# Patient Record
Sex: FEMALE | Race: BLACK OR AFRICAN AMERICAN | NOT HISPANIC OR LATINO | Employment: FULL TIME | URBAN - METROPOLITAN AREA
[De-identification: names, ages, dates, MRNs, and addresses within clinical notes are randomized per-mention and may not be internally consistent; named-entity substitution may affect disease eponyms.]

---

## 2017-12-18 ENCOUNTER — OFFICE VISIT (OUTPATIENT)
Dept: INTERNAL MEDICINE | Facility: CLINIC | Age: 27
End: 2017-12-18
Payer: COMMERCIAL

## 2017-12-18 ENCOUNTER — LAB VISIT (OUTPATIENT)
Dept: LAB | Facility: HOSPITAL | Age: 27
End: 2017-12-18
Attending: INTERNAL MEDICINE
Payer: COMMERCIAL

## 2017-12-18 VITALS
HEART RATE: 73 BPM | HEIGHT: 66 IN | SYSTOLIC BLOOD PRESSURE: 128 MMHG | OXYGEN SATURATION: 97 % | BODY MASS INDEX: 23.77 KG/M2 | DIASTOLIC BLOOD PRESSURE: 72 MMHG | WEIGHT: 147.94 LBS | TEMPERATURE: 97 F

## 2017-12-18 DIAGNOSIS — Z00.00 ROUTINE GENERAL MEDICAL EXAMINATION AT A HEALTH CARE FACILITY: ICD-10-CM

## 2017-12-18 DIAGNOSIS — E55.9 VITAMIN D DEFICIENCY: ICD-10-CM

## 2017-12-18 DIAGNOSIS — Z00.00 ROUTINE GENERAL MEDICAL EXAMINATION AT A HEALTH CARE FACILITY: Primary | ICD-10-CM

## 2017-12-18 LAB
25(OH)D3+25(OH)D2 SERPL-MCNC: 22 NG/ML
ALBUMIN SERPL BCP-MCNC: 3.8 G/DL
ALP SERPL-CCNC: 58 U/L
ALT SERPL W/O P-5'-P-CCNC: 12 U/L
ANION GAP SERPL CALC-SCNC: 6 MMOL/L
AST SERPL-CCNC: 16 U/L
BASOPHILS # BLD AUTO: 0.05 K/UL
BASOPHILS NFR BLD: 1 %
BILIRUB SERPL-MCNC: 0.6 MG/DL
BUN SERPL-MCNC: 7 MG/DL
CALCIUM SERPL-MCNC: 9.6 MG/DL
CHLORIDE SERPL-SCNC: 107 MMOL/L
CHOLEST SERPL-MCNC: 148 MG/DL
CHOLEST/HDLC SERPL: 2.2 {RATIO}
CO2 SERPL-SCNC: 25 MMOL/L
CREAT SERPL-MCNC: 0.9 MG/DL
DIFFERENTIAL METHOD: ABNORMAL
EOSINOPHIL # BLD AUTO: 0.2 K/UL
EOSINOPHIL NFR BLD: 3.5 %
ERYTHROCYTE [DISTWIDTH] IN BLOOD BY AUTOMATED COUNT: 13.1 %
EST. GFR  (AFRICAN AMERICAN): >60 ML/MIN/1.73 M^2
EST. GFR  (NON AFRICAN AMERICAN): >60 ML/MIN/1.73 M^2
GLUCOSE SERPL-MCNC: 79 MG/DL
HCT VFR BLD AUTO: 37 %
HDLC SERPL-MCNC: 67 MG/DL
HDLC SERPL: 45.3 %
HGB BLD-MCNC: 11.6 G/DL
IMM GRANULOCYTES # BLD AUTO: 0.01 K/UL
IMM GRANULOCYTES NFR BLD AUTO: 0.2 %
LDLC SERPL CALC-MCNC: 74.4 MG/DL
LYMPHOCYTES # BLD AUTO: 2.2 K/UL
LYMPHOCYTES NFR BLD: 42.4 %
MCH RBC QN AUTO: 27.3 PG
MCHC RBC AUTO-ENTMCNC: 31.4 G/DL
MCV RBC AUTO: 87 FL
MONOCYTES # BLD AUTO: 0.4 K/UL
MONOCYTES NFR BLD: 8.1 %
NEUTROPHILS # BLD AUTO: 2.3 K/UL
NEUTROPHILS NFR BLD: 44.8 %
NONHDLC SERPL-MCNC: 81 MG/DL
NRBC BLD-RTO: 0 /100 WBC
PLATELET # BLD AUTO: 292 K/UL
PMV BLD AUTO: 11 FL
POTASSIUM SERPL-SCNC: 4.6 MMOL/L
PROT SERPL-MCNC: 7.2 G/DL
RBC # BLD AUTO: 4.25 M/UL
SODIUM SERPL-SCNC: 138 MMOL/L
T4 FREE SERPL-MCNC: 1.01 NG/DL
TRIGL SERPL-MCNC: 33 MG/DL
TSH SERPL DL<=0.005 MIU/L-ACNC: 0.47 UIU/ML
WBC # BLD AUTO: 5.19 K/UL

## 2017-12-18 PROCEDURE — 99999 PR PBB SHADOW E&M-NEW PATIENT-LVL III: CPT | Mod: PBBFAC,,, | Performed by: INTERNAL MEDICINE

## 2017-12-18 PROCEDURE — 85025 COMPLETE CBC W/AUTO DIFF WBC: CPT

## 2017-12-18 PROCEDURE — 36415 COLL VENOUS BLD VENIPUNCTURE: CPT | Mod: PO

## 2017-12-18 PROCEDURE — 80061 LIPID PANEL: CPT

## 2017-12-18 PROCEDURE — 99204 OFFICE O/P NEW MOD 45 MIN: CPT | Mod: S$GLB,,, | Performed by: INTERNAL MEDICINE

## 2017-12-18 PROCEDURE — 84443 ASSAY THYROID STIM HORMONE: CPT

## 2017-12-18 PROCEDURE — 82306 VITAMIN D 25 HYDROXY: CPT

## 2017-12-18 PROCEDURE — 84439 ASSAY OF FREE THYROXINE: CPT

## 2017-12-18 PROCEDURE — 80053 COMPREHEN METABOLIC PANEL: CPT

## 2017-12-18 RX ORDER — ETONOGESTREL AND ETHINYL ESTRADIOL VAGINAL RING .015; .12 MG/D; MG/D
1 RING VAGINAL
COMMUNITY
End: 2018-10-11 | Stop reason: SDUPTHER

## 2017-12-18 NOTE — PROGRESS NOTES
"Subjective:      Patient ID: Jessy Ferraro is a 27 y.o. female.    Chief Complaint: Establish Care and Leg Pain      HPI  Ms. Ferraro who presents to [facility] primary care. She reports having left leg pain over the past week, which she describes as a burning sensation. No recent trauma. No cp. No sob.     Past Medical History:   Diagnosis Date    Chronic back pain     She attributes this to enlarged breasts since adolecense.     History reviewed. No pertinent surgical history.  Social History     Social History    Marital status: Single     Spouse name: N/A    Number of children: N/A    Years of education: N/A     Occupational History    Not on file.     Social History Main Topics    Smoking status: Never Smoker    Smokeless tobacco: Never Used    Alcohol use 3.6 oz/week     6 Shots of liquor per week      Comment: 2 glasses 3x/wk    Drug use: No    Sexual activity: Yes     Partners: Male      Comment: 1 partner     Other Topics Concern    Not on file     Social History Narrative    Life goal: History, currently at [employer], working on her PhD        Breakfast: Oatmeal or eggs & avacodo; water or tea or coffee with creamer    Lunch: Salad or tuna-fish sandwich; water or Smoothie    Dinner: Soup; water    Snacks: Chocolate or nuts    Eats out: 2x/wk     Water: 7 (16 oz) bottles/day    PA: None    Goal weight is 130 lbs         History reviewed. No pertinent family history.    Current Outpatient Prescriptions:     etonogestrel-ethinyl estradiol (NUVARING) 0.12-0.015 mg/24 hr vaginal ring, Place 1 each vaginally every 28 days., Disp: , Rfl:     Review of patient's allergies indicates:  No Known Allergies    Review of Systems   Negative.     Objective:     /72 (BP Location: Left arm, Patient Position: Sitting, BP Method: Medium (Automatic))   Pulse 73   Temp 97.3 °F (36.3 °C) (Tympanic)   Ht 5' 6" (1.676 m)   Wt 67.1 kg (147 lb 14.9 oz)   LMP 11/27/2017 (Exact Date) Comment: nuvaring  SpO2 97%   BMI " 23.88 kg/m²     Physical Exam  GEN: A&O fully, NAD  PSYC: Normal affect  CV: RRR, no M/G/R  PULM: CTA bilaterally, no wheezes, rales  GI: S/NT/ND, normal bowel sounds  EXT: No C/C/E, negative Adams's  BREAST: No axillary LAD, no breast masses, no nipple discharge  NEURO: CN II-XII intact, 5/5 strength globally, no sensory losses, normal tandem gait, normal Romberg, 2+ DTRs globally      Assessment:      1. Routine general medical examination at a health care facility    2. Vitamin D deficiency    3.      LBP: Likely 2/2 large breasts over several years. Encouraged weight loss. Discussed strategies for            lifestyle modifications, including systematically increasing physical activity, water; and avoiding potatoes,            sugar sweetened beverages, red/processed meats, and fast food; and increasing yogurt, whole fruits,            unsalted nuts, whole grains, non-starchy vegetables, beans, weight logging.  4.      Left leg pain: Normal PX. Will check for metabolic etiologies. Unlikely DVT given absence of PX findings           or RFs.    Plan:   Routine general medical examination at a health care facility  -     CBC auto differential; Future; Expected date: 12/18/2017  -     Comprehensive metabolic panel; Future; Expected date: 12/18/2017  -     Lipid panel; Future; Expected date: 12/18/2017  -     T4, free; Future; Expected date: 12/18/2017  -     TSH; Future; Expected date: 12/18/2017    Vitamin D deficiency  -     Vitamin D; Future; Expected date: 12/18/2017      RTC in 3 months RE LBP or sooner as needed

## 2018-02-08 ENCOUNTER — OFFICE VISIT (OUTPATIENT)
Dept: INTERNAL MEDICINE | Facility: CLINIC | Age: 28
End: 2018-02-08
Payer: COMMERCIAL

## 2018-02-08 VITALS
BODY MASS INDEX: 23.7 KG/M2 | SYSTOLIC BLOOD PRESSURE: 131 MMHG | RESPIRATION RATE: 18 BRPM | WEIGHT: 147.5 LBS | HEIGHT: 66 IN | DIASTOLIC BLOOD PRESSURE: 61 MMHG | TEMPERATURE: 100 F | OXYGEN SATURATION: 98 % | HEART RATE: 104 BPM

## 2018-02-08 DIAGNOSIS — G89.29 CHRONIC LOW BACK PAIN WITHOUT SCIATICA, UNSPECIFIED BACK PAIN LATERALITY: ICD-10-CM

## 2018-02-08 DIAGNOSIS — M54.50 CHRONIC LOW BACK PAIN WITHOUT SCIATICA, UNSPECIFIED BACK PAIN LATERALITY: ICD-10-CM

## 2018-02-08 DIAGNOSIS — N10 PYELONEPHRITIS, ACUTE: Primary | ICD-10-CM

## 2018-02-08 LAB
BILIRUB SERPL-MCNC: NEGATIVE MG/DL
BLOOD, POC UA: ABNORMAL
GLUCOSE UR QL STRIP: NORMAL
KETONES UR QL STRIP: NEGATIVE
LEUKOCYTE ESTERASE URINE, POC: ABNORMAL
NITRITE, POC UA: POSITIVE
PH, POC UA: 6
PROTEIN, POC: ABNORMAL
SPECIFIC GRAVITY, POC UA: 1
UROBILINOGEN, POC UA: NORMAL

## 2018-02-08 PROCEDURE — 99213 OFFICE O/P EST LOW 20 MIN: CPT | Mod: 25,S$GLB,, | Performed by: INTERNAL MEDICINE

## 2018-02-08 PROCEDURE — 87088 URINE BACTERIA CULTURE: CPT

## 2018-02-08 PROCEDURE — 87186 SC STD MICRODIL/AGAR DIL: CPT

## 2018-02-08 PROCEDURE — 81000 URINALYSIS NONAUTO W/SCOPE: CPT | Mod: S$GLB,,, | Performed by: INTERNAL MEDICINE

## 2018-02-08 PROCEDURE — 99999 PR PBB SHADOW E&M-EST. PATIENT-LVL III: CPT | Mod: PBBFAC,,, | Performed by: INTERNAL MEDICINE

## 2018-02-08 PROCEDURE — 3008F BODY MASS INDEX DOCD: CPT | Mod: S$GLB,,, | Performed by: INTERNAL MEDICINE

## 2018-02-08 PROCEDURE — 87077 CULTURE AEROBIC IDENTIFY: CPT

## 2018-02-08 PROCEDURE — 87086 URINE CULTURE/COLONY COUNT: CPT

## 2018-02-08 RX ORDER — SULFAMETHOXAZOLE AND TRIMETHOPRIM 800; 160 MG/1; MG/1
1 TABLET ORAL 2 TIMES DAILY
Qty: 28 TABLET | Refills: 0 | Status: SHIPPED | OUTPATIENT
Start: 2018-02-08 | End: 2018-02-22

## 2018-02-08 NOTE — PROGRESS NOTES
Subjective:      Patient ID: Jessy Ferraro is a 27 y.o. female.    Chief Complaint: Flank Pain and Urinary Tract Infection      HPI     Ms. Jessy Ferraro is a patient of Anthony Williamson MD, who presents for a 1 month h/o urinary frequency and urgency. She has noted a dark orange urine color with a foul smell. 2 days ago she noted right flank pain. No fever, but +chills. +nausea, but no emesis. She also notes HA over the past few days.        Past Medical History:   Diagnosis Date    Chronic back pain     She attributes this to enlarged breasts since adolecense.     History reviewed. No pertinent surgical history.  Social History     Social History    Marital status: Single     Spouse name: N/A    Number of children: N/A    Years of education: N/A     Occupational History    Not on file.     Social History Main Topics    Smoking status: Never Smoker    Smokeless tobacco: Never Used    Alcohol use 3.6 oz/week     6 Shots of liquor per week      Comment: 2 glasses 3x/wk    Drug use: No    Sexual activity: Yes     Partners: Male      Comment: 1 partner     Other Topics Concern    Not on file     Social History Narrative    Life goal: History, currently at [employer], working on her PhD        Breakfast: Oatmeal or eggs & avacodo; water or tea or coffee with creamer    Lunch: Salad or tuna-fish sandwich; water or Smoothie    Dinner: Soup; water    Snacks: Chocolate or nuts    Eats out: 2x/wk     Water: 7 (16 oz) bottles/day    PA: None    Goal weight is 130 lbs         History reviewed. No pertinent family history.    Current Outpatient Prescriptions:     etonogestrel-ethinyl estradiol (NUVARING) 0.12-0.015 mg/24 hr vaginal ring, Place 1 each vaginally every 28 days., Disp: , Rfl:     sulfamethoxazole-trimethoprim 800-160mg (BACTRIM DS) 800-160 mg Tab, Take 1 tablet by mouth 2 (two) times daily., Disp: 28 tablet, Rfl: 0    Review of patient's allergies indicates:  No Known Allergies     Review of Systems  "  Negative    Objective:     /61 (BP Location: Left arm, Patient Position: Sitting, BP Method: Medium (Automatic))   Pulse 104   Temp 99.9 °F (37.7 °C) (Tympanic)   Resp 18   Ht 5' 6" (1.676 m)   Wt 66.9 kg (147 lb 7.8 oz)   SpO2 98%   BMI 23.81 kg/m²     Physical Exam  GEN: A&O fully, NAD  PSYC: Normal affect  : No TTP, but + tenderness to percussion at right costophrenic angle       Lab Results   Component Value Date    WBC 5.19 12/18/2017    HGB 11.6 (L) 12/18/2017    HCT 37.0 12/18/2017     12/18/2017    CHOL 148 12/18/2017    TRIG 33 12/18/2017    HDL 67 12/18/2017    LDLCALC 74.4 12/18/2017    ALT 12 12/18/2017    AST 16 12/18/2017     12/18/2017    K 4.6 12/18/2017     12/18/2017    CREATININE 0.9 12/18/2017    BUN 7 12/18/2017    CO2 25 12/18/2017    TSH 0.475 12/18/2017       Assessment:      1. Pyelonephritis, acute: Risks and benefits discussed and patient chose to move forward with bactrim DS 1 po BID x14d. She understands to go to the ER if her symptoms worsen or fail to improve in a few days.    2. Chronic low back pain without sciatica, unspecified back pain laterality: She would like a referral for breast reduction surgery due to chronic LBP 2/2 stress from breasts.        Plan:   Pyelonephritis, acute  -     sulfamethoxazole-trimethoprim 800-160mg (BACTRIM DS) 800-160 mg Tab; Take 1 tablet by mouth 2 (two) times daily.  Dispense: 28 tablet; Refill: 0  -     Urine culture    Chronic low back pain without sciatica, unspecified back pain laterality  -     Ambulatory consult to Plastic Surgery        Follow-up in about 4 weeks (around 3/8/2018), or if symptoms worsen or fail to improve, for FU on LBP.  "

## 2018-02-12 LAB — BACTERIA UR CULT: NORMAL

## 2018-03-02 ENCOUNTER — TELEPHONE (OUTPATIENT)
Dept: INTERNAL MEDICINE | Facility: CLINIC | Age: 28
End: 2018-03-02

## 2018-03-02 NOTE — TELEPHONE ENCOUNTER
----- Message from Mary Carter sent at 3/2/2018  9:00 AM CST -----  Contact: pt  Pt states she's calling to find out about the status of her referral for a breast reduction, she can be reached at [phone] Thanks

## 2018-03-02 NOTE — TELEPHONE ENCOUNTER
Spoke with pt, she stated she would like to know what plastic surgeon  recommended for pt's breast reduction. Let pt know I would send message to  and call back with information on Monday. Pt verbalized understanding.

## 2018-03-05 ENCOUNTER — PATIENT OUTREACH (OUTPATIENT)
Dept: ADMINISTRATIVE | Facility: HOSPITAL | Age: 28
End: 2018-03-05

## 2018-09-06 ENCOUNTER — OFFICE VISIT (OUTPATIENT)
Dept: INTERNAL MEDICINE | Facility: CLINIC | Age: 28
End: 2018-09-06
Payer: COMMERCIAL

## 2018-09-06 ENCOUNTER — LAB VISIT (OUTPATIENT)
Dept: LAB | Facility: HOSPITAL | Age: 28
End: 2018-09-06
Attending: INTERNAL MEDICINE
Payer: COMMERCIAL

## 2018-09-06 VITALS
TEMPERATURE: 98 F | HEART RATE: 71 BPM | RESPIRATION RATE: 18 BRPM | SYSTOLIC BLOOD PRESSURE: 120 MMHG | HEIGHT: 66 IN | DIASTOLIC BLOOD PRESSURE: 60 MMHG | OXYGEN SATURATION: 98 % | WEIGHT: 142.19 LBS | BODY MASS INDEX: 22.85 KG/M2

## 2018-09-06 DIAGNOSIS — G89.29 OTHER CHRONIC BACK PAIN: ICD-10-CM

## 2018-09-06 DIAGNOSIS — Z01.818 PREOP EXAMINATION: ICD-10-CM

## 2018-09-06 DIAGNOSIS — E55.9 VITAMIN D DEFICIENCY: ICD-10-CM

## 2018-09-06 DIAGNOSIS — Z01.818 PREOP EXAMINATION: Primary | ICD-10-CM

## 2018-09-06 DIAGNOSIS — M54.9 OTHER CHRONIC BACK PAIN: ICD-10-CM

## 2018-09-06 LAB
ALBUMIN SERPL BCP-MCNC: 4.1 G/DL
ALP SERPL-CCNC: 62 U/L
ALT SERPL W/O P-5'-P-CCNC: 13 U/L
ANION GAP SERPL CALC-SCNC: 8 MMOL/L
AST SERPL-CCNC: 17 U/L
BASOPHILS # BLD AUTO: 0.06 K/UL
BASOPHILS NFR BLD: 1 %
BILIRUB SERPL-MCNC: 0.4 MG/DL
BUN SERPL-MCNC: 6 MG/DL
CALCIUM SERPL-MCNC: 10.1 MG/DL
CHLORIDE SERPL-SCNC: 106 MMOL/L
CO2 SERPL-SCNC: 25 MMOL/L
CREAT SERPL-MCNC: 0.8 MG/DL
DIFFERENTIAL METHOD: ABNORMAL
EOSINOPHIL # BLD AUTO: 0.1 K/UL
EOSINOPHIL NFR BLD: 2.4 %
ERYTHROCYTE [DISTWIDTH] IN BLOOD BY AUTOMATED COUNT: 12.7 %
EST. GFR  (AFRICAN AMERICAN): >60 ML/MIN/1.73 M^2
EST. GFR  (NON AFRICAN AMERICAN): >60 ML/MIN/1.73 M^2
GLUCOSE SERPL-MCNC: 84 MG/DL
HCT VFR BLD AUTO: 39.3 %
HGB BLD-MCNC: 12.4 G/DL
IMM GRANULOCYTES # BLD AUTO: 0.01 K/UL
IMM GRANULOCYTES NFR BLD AUTO: 0.2 %
LYMPHOCYTES # BLD AUTO: 3.3 K/UL
LYMPHOCYTES NFR BLD: 54.9 %
MCH RBC QN AUTO: 28.6 PG
MCHC RBC AUTO-ENTMCNC: 31.6 G/DL
MCV RBC AUTO: 91 FL
MONOCYTES # BLD AUTO: 0.5 K/UL
MONOCYTES NFR BLD: 8.4 %
NEUTROPHILS # BLD AUTO: 2 K/UL
NEUTROPHILS NFR BLD: 33.1 %
NRBC BLD-RTO: 0 /100 WBC
PLATELET # BLD AUTO: 336 K/UL
PMV BLD AUTO: 10.6 FL
POTASSIUM SERPL-SCNC: 4.4 MMOL/L
PROT SERPL-MCNC: 7.5 G/DL
RBC # BLD AUTO: 4.34 M/UL
SODIUM SERPL-SCNC: 139 MMOL/L
WBC # BLD AUTO: 5.94 K/UL

## 2018-09-06 PROCEDURE — 99213 OFFICE O/P EST LOW 20 MIN: CPT | Mod: S$GLB,,, | Performed by: INTERNAL MEDICINE

## 2018-09-06 PROCEDURE — 85025 COMPLETE CBC W/AUTO DIFF WBC: CPT

## 2018-09-06 PROCEDURE — 36415 COLL VENOUS BLD VENIPUNCTURE: CPT | Mod: PO

## 2018-09-06 PROCEDURE — 3008F BODY MASS INDEX DOCD: CPT | Mod: CPTII,S$GLB,, | Performed by: INTERNAL MEDICINE

## 2018-09-06 PROCEDURE — 80053 COMPREHEN METABOLIC PANEL: CPT

## 2018-09-06 PROCEDURE — 99999 PR PBB SHADOW E&M-EST. PATIENT-LVL III: CPT | Mod: PBBFAC,,, | Performed by: INTERNAL MEDICINE

## 2018-09-06 PROCEDURE — 93005 ELECTROCARDIOGRAM TRACING: CPT | Mod: S$GLB,,, | Performed by: INTERNAL MEDICINE

## 2018-09-06 PROCEDURE — 93010 ELECTROCARDIOGRAM REPORT: CPT | Mod: S$GLB,,, | Performed by: INTERNAL MEDICINE

## 2018-09-06 NOTE — PROGRESS NOTES
Subjective:      Patient ID: Jessy Ferraro is a 27 y.o. female.    Chief Complaint: Pre-op Exam      HPI     Ms. Jessy Ferraro is a patient of Anthony Williamson MD, who presents for pre-op for a bilateral breast reduction surgery at the [facility] in [place], [place] on 9/24/18.     No problems with her health.     No cp, no sob, she runs 1 mile once per week, but is limmitted by discomfort from breast tissue, which causes some GUILLORY, which she attributes to the breast tissue. No associated n/v. No associated diaphoresis.       Past Medical History:   Diagnosis Date    Chronic back pain     She attributes this to enlarged breasts since adolecense.    Vitamin D deficiency      History reviewed. No pertinent surgical history.  Social History     Socioeconomic History    Marital status: Single     Spouse name: Not on file    Number of children: Not on file    Years of education: Not on file    Highest education level: Not on file   Social Needs    Financial resource strain: Not on file    Food insecurity - worry: Not on file    Food insecurity - inability: Not on file    Transportation needs - medical: Not on file    Transportation needs - non-medical: Not on file   Occupational History    Not on file   Tobacco Use    Smoking status: Never Smoker    Smokeless tobacco: Never Used   Substance and Sexual Activity    Alcohol use: Yes     Alcohol/week: 3.6 oz     Types: 6 Shots of liquor per week     Comment: 2 glasses 3x/wk    Drug use: No    Sexual activity: Yes     Partners: Male     Comment: 1 partner   Other Topics Concern    Not on file   Social History Narrative    Life goal: History, currently at [employer], working on her PhD        Breakfast: Oatmeal or eggs & avacodo; water or tea or coffee with creamer    Lunch: Salad or tuna-fish sandwich; water or Smoothie    Dinner: Soup; water    Snacks: Chocolate or nuts    Eats out: 2x/wk     Water: 7 (16 oz) bottles/day    PA: Runs 1x/wk, yoga 30-90  "min/day (previously none)    Goal weight is 130 lbs     History reviewed. No pertinent family history.    Current Outpatient Medications:     etonogestrel-ethinyl estradiol (NUVARING) 0.12-0.015 mg/24 hr vaginal ring, Place 1 each vaginally every 28 days., Disp: , Rfl:     Review of patient's allergies indicates:  No Known Allergies     Review of Systems   All remaining systems negative    Objective:     /60 (BP Location: Left arm, Patient Position: Sitting, BP Method: Medium (Manual))   Pulse 71   Temp 98.4 °F (36.9 °C) (Tympanic)   Resp 18   Ht 5' 6" (1.676 m)   Wt 64.5 kg (142 lb 3.2 oz)   SpO2 98%   BMI 22.95 kg/m²     Physical Exam  GEN: A&O fully, NAD  PSYC: Normal affect  HEENT: OP: Clear, no LAD, no thyroid masses, auditory canals and TMs WNL  CV: RRR, no M/G/R  PULM: CTA bilaterally, no wheezes, rales, crackles   GI: S/NT/ND, normal bowel sounds  EXT: No C/C/E, normal DP pulses bilaterally  NEURO: CN II-XII intact, 5/5 strength globally, no sensory losses, normal tandem gait, normal Romberg, 2+ DTRs globally      Lab Results   Component Value Date    WBC 5.19 12/18/2017    HGB 11.6 (L) 12/18/2017    HCT 37.0 12/18/2017     12/18/2017    CHOL 148 12/18/2017    TRIG 33 12/18/2017    HDL 67 12/18/2017    LDLCALC 74.4 12/18/2017    ALT 12 12/18/2017    AST 16 12/18/2017     12/18/2017    K 4.6 12/18/2017     12/18/2017    CREATININE 0.9 12/18/2017    BUN 7 12/18/2017    CO2 25 12/18/2017    CALCIUM 9.6 12/18/2017       Assessment:      1. Preop examination: Low risk patient for a moderate risk surgery (general anesthesia). Will check cbc w/ diff, cmp and EKG per pre-op requirements. No further tests indicated at this time.    2. Vitamin D deficiency: Currently not supplementing. Will check in 3 months.    3. Other chronic back pain: Related to excessive breast tissue, which is being addressed as above. Will f/u in 3 months.        Plan:   Preop examination  -     CBC auto " differential; Future; Expected date: 09/06/2018  -     Comprehensive metabolic panel; Future; Expected date: 09/06/2018  -     EKG 12-lead    Vitamin D deficiency    Other chronic back pain        Follow-up in about 3 months (around 12/6/2018), or if symptoms worsen or fail to improve, for Annual.    I spent 25 minutes of time with patient 50% or more of which was discussing labs and plans of care.

## 2018-10-11 ENCOUNTER — OFFICE VISIT (OUTPATIENT)
Dept: OBSTETRICS AND GYNECOLOGY | Facility: CLINIC | Age: 28
End: 2018-10-11
Payer: COMMERCIAL

## 2018-10-11 VITALS
BODY MASS INDEX: 22.49 KG/M2 | WEIGHT: 139.31 LBS | SYSTOLIC BLOOD PRESSURE: 106 MMHG | DIASTOLIC BLOOD PRESSURE: 68 MMHG

## 2018-10-11 DIAGNOSIS — Z12.4 PAP SMEAR FOR CERVICAL CANCER SCREENING: ICD-10-CM

## 2018-10-11 DIAGNOSIS — Z11.3 SCREEN FOR STD (SEXUALLY TRANSMITTED DISEASE): ICD-10-CM

## 2018-10-11 DIAGNOSIS — Z30.44 ENCOUNTER FOR SURVEILLANCE OF VAGINAL RING HORMONAL CONTRACEPTIVE DEVICE: Primary | ICD-10-CM

## 2018-10-11 PROCEDURE — 88175 CYTOPATH C/V AUTO FLUID REDO: CPT

## 2018-10-11 PROCEDURE — 87491 CHLMYD TRACH DNA AMP PROBE: CPT

## 2018-10-11 PROCEDURE — 99999 PR PBB SHADOW E&M-EST. PATIENT-LVL III: CPT | Mod: PBBFAC,,, | Performed by: OBSTETRICS & GYNECOLOGY

## 2018-10-11 PROCEDURE — 99385 PREV VISIT NEW AGE 18-39: CPT | Mod: S$GLB,,, | Performed by: OBSTETRICS & GYNECOLOGY

## 2018-10-11 RX ORDER — ETONOGESTREL AND ETHINYL ESTRADIOL VAGINAL RING .015; .12 MG/D; MG/D
RING VAGINAL
Qty: 3 EACH | Refills: 3 | Status: SHIPPED | OUTPATIENT
Start: 2018-10-11 | End: 2019-02-27 | Stop reason: SDUPTHER

## 2018-10-11 RX ORDER — TRAMADOL HYDROCHLORIDE 50 MG/1
50 TABLET ORAL EVERY 6 HOURS PRN
COMMUNITY
End: 2018-11-30

## 2018-10-11 RX ORDER — CELECOXIB 50 MG/1
100 CAPSULE ORAL
COMMUNITY
End: 2018-11-30

## 2018-10-11 NOTE — PROGRESS NOTES
Subjective:       Patient ID: Jessy Ferraro is a 27 y.o. female.    Chief Complaint:  Well Woman      History of Present Illness  HPI  Annual Exam-Premenopausal  Patient presents for annual exam. The patient has no complaints today. The patient is sexually active. GYN screening history: last pap: patient does not recall when last pap was. The patient wears seatbelts: yes. The patient participates in regular exercise: yes. Has the patient ever been transfused or tattooed?: yes. The patient reports that there is not domestic violence in her life.  Menses are regular.  Denies excessive bleeding or cramping.  Doing well on Nuvaring.  Desires refill.  Had breast reduction surgery 2 weeks ago.  Requests Gc/Ct screening.  Denies known exposure.      GYN & OB History  No LMP recorded. Patient is not currently having periods (Reason: Birth Control).   Date of Last Pap: No result found    OB History   No data available       Review of Systems  Review of Systems   Constitutional: Negative for activity change, appetite change, fatigue, fever and unexpected weight change.   Respiratory: Negative for shortness of breath.    Cardiovascular: Negative for chest pain, palpitations and leg swelling.   Gastrointestinal: Negative for abdominal pain, bloating, blood in stool, constipation, diarrhea, nausea and vomiting.   Genitourinary: Negative for dyspareunia, dysuria, flank pain, frequency, genital sores, hematuria, menorrhagia, menstrual problem, pelvic pain, urgency, vaginal bleeding, vaginal discharge, vaginal pain, dysmenorrhea, urinary incontinence and vaginal odor.   Musculoskeletal: Positive for back pain.   Neurological: Negative for syncope and headaches.   Breast: Negative for breast mass, breast pain, nipple discharge and skin changes          Objective:    Physical Exam:   Constitutional: She is oriented to person, place, and time. She appears well-developed and well-nourished. No distress.    HENT:   Head: Normocephalic  and atraumatic.    Eyes: EOM are normal. Pupils are equal, round, and reactive to light.    Neck: Normal range of motion. Neck supple.    Cardiovascular: Normal rate, regular rhythm and normal heart sounds.     Pulmonary/Chest: Effort normal and breath sounds normal.        Abdominal: Soft. Bowel sounds are normal. She exhibits no distension. There is no tenderness.     Genitourinary: Vagina normal and uterus normal. Pelvic exam was performed with patient supine. There is no rash, tenderness, lesion or injury on the right labia. There is no rash, tenderness, lesion or injury on the left labia. Uterus is not deviated, not enlarged and not tender. Cervix is normal. Right adnexum displays no mass, no tenderness and no fullness. Left adnexum displays no mass, no tenderness and no fullness. No erythema, tenderness or bleeding in the vagina. No foreign body in the vagina. No signs of injury around the vagina. No vaginal discharge found. Cervix exhibits no motion tenderness, no discharge and no friability.           Musculoskeletal: Normal range of motion and moves all extremeties. She exhibits no edema or tenderness.       Neurological: She is alert and oriented to person, place, and time.    Skin: Skin is warm and dry.    Psychiatric: She has a normal mood and affect. Her behavior is normal. Thought content normal.          Assessment:        1. Encounter for surveillance of vaginal ring hormonal contraceptive device    2. Pap smear for cervical cancer screening    3. Screen for STD (sexually transmitted disease)             Plan:      Encounter for surveillance of vaginal ring hormonal contraceptive device  -     etonogestrel-ethinyl estradiol (NUVARING) 0.12-0.015 mg/24 hr vaginal ring; Place 1 ring intravaginally and leave in place for 21 days.  Leave ring out for 7 days each month to allow for menses.  Dispense: 3 each; Refill: 3  -     Pt was counseled on contraception options, including associated risks and  benefits of each.  Pt voiced understanding and desires to proceed with Nuvaring.  Medication dosing, side-effects, risks, benefits, and alternatives were discussed.  Medical history was reviewed and pt is a candidate for Nuvaring use.    Pap smear for cervical cancer screening  -     Liquid-based pap smear, screening  -     Pt was counseled on cervical/vaginal screening guidelines and recommendations.  If today's pap smear result is negative, next pap smear will be due in 2021.  -     Pt was advised on current breast cancer screening recommendations.    -     Follow up with PCP for routine health maintenance needs.    Screen for STD (sexually transmitted disease)  -     C. trachomatis/N. gonorrhoeae by AMP DNA      Follow-up in about 1 year (around 10/11/2019).

## 2018-10-12 LAB
C TRACH DNA SPEC QL NAA+PROBE: NOT DETECTED
N GONORRHOEA DNA SPEC QL NAA+PROBE: NOT DETECTED

## 2018-11-29 ENCOUNTER — PATIENT OUTREACH (OUTPATIENT)
Dept: ADMINISTRATIVE | Facility: HOSPITAL | Age: 28
End: 2018-11-29

## 2018-11-30 ENCOUNTER — OFFICE VISIT (OUTPATIENT)
Dept: INTERNAL MEDICINE | Facility: CLINIC | Age: 28
End: 2018-11-30
Payer: COMMERCIAL

## 2018-11-30 ENCOUNTER — LAB VISIT (OUTPATIENT)
Dept: LAB | Facility: HOSPITAL | Age: 28
End: 2018-11-30
Attending: INTERNAL MEDICINE
Payer: COMMERCIAL

## 2018-11-30 VITALS
SYSTOLIC BLOOD PRESSURE: 100 MMHG | HEIGHT: 65 IN | OXYGEN SATURATION: 98 % | RESPIRATION RATE: 18 BRPM | HEART RATE: 96 BPM | BODY MASS INDEX: 23.25 KG/M2 | DIASTOLIC BLOOD PRESSURE: 70 MMHG | WEIGHT: 139.56 LBS | TEMPERATURE: 98 F

## 2018-11-30 DIAGNOSIS — R05.9 COUGH: ICD-10-CM

## 2018-11-30 DIAGNOSIS — E55.9 VITAMIN D DEFICIENCY: ICD-10-CM

## 2018-11-30 DIAGNOSIS — K13.0 ANGULAR CHEILITIS: Primary | ICD-10-CM

## 2018-11-30 LAB
25(OH)D3+25(OH)D2 SERPL-MCNC: 11 NG/ML
ALBUMIN SERPL BCP-MCNC: 4.1 G/DL
ALP SERPL-CCNC: 70 U/L
ALT SERPL W/O P-5'-P-CCNC: 13 U/L
ANION GAP SERPL CALC-SCNC: 7 MMOL/L
AST SERPL-CCNC: 17 U/L
BASOPHILS # BLD AUTO: 0.05 K/UL
BASOPHILS NFR BLD: 1.2 %
BILIRUB SERPL-MCNC: 0.7 MG/DL
BUN SERPL-MCNC: 7 MG/DL
CALCIUM SERPL-MCNC: 9.4 MG/DL
CHLORIDE SERPL-SCNC: 104 MMOL/L
CHOLEST SERPL-MCNC: 183 MG/DL
CHOLEST/HDLC SERPL: 2.7 {RATIO}
CO2 SERPL-SCNC: 25 MMOL/L
CREAT SERPL-MCNC: 0.8 MG/DL
DIFFERENTIAL METHOD: ABNORMAL
EOSINOPHIL # BLD AUTO: 0.1 K/UL
EOSINOPHIL NFR BLD: 1.2 %
ERYTHROCYTE [DISTWIDTH] IN BLOOD BY AUTOMATED COUNT: 12.6 %
EST. GFR  (AFRICAN AMERICAN): >60 ML/MIN/1.73 M^2
EST. GFR  (NON AFRICAN AMERICAN): >60 ML/MIN/1.73 M^2
GLUCOSE SERPL-MCNC: 80 MG/DL
HCT VFR BLD AUTO: 41.4 %
HDLC SERPL-MCNC: 68 MG/DL
HDLC SERPL: 37.2 %
HGB BLD-MCNC: 13 G/DL
IMM GRANULOCYTES # BLD AUTO: 0.01 K/UL
IMM GRANULOCYTES NFR BLD AUTO: 0.2 %
LDLC SERPL CALC-MCNC: 106.8 MG/DL
LYMPHOCYTES # BLD AUTO: 1.8 K/UL
LYMPHOCYTES NFR BLD: 43.9 %
MCH RBC QN AUTO: 27.9 PG
MCHC RBC AUTO-ENTMCNC: 31.4 G/DL
MCV RBC AUTO: 89 FL
MONOCYTES # BLD AUTO: 0.4 K/UL
MONOCYTES NFR BLD: 8.9 %
NEUTROPHILS # BLD AUTO: 1.9 K/UL
NEUTROPHILS NFR BLD: 44.6 %
NONHDLC SERPL-MCNC: 115 MG/DL
NRBC BLD-RTO: 0 /100 WBC
PLATELET # BLD AUTO: 369 K/UL
PMV BLD AUTO: 10.6 FL
POTASSIUM SERPL-SCNC: 4.3 MMOL/L
PROT SERPL-MCNC: 7.6 G/DL
RBC # BLD AUTO: 4.66 M/UL
SODIUM SERPL-SCNC: 136 MMOL/L
TRIGL SERPL-MCNC: 41 MG/DL
WBC # BLD AUTO: 4.17 K/UL

## 2018-11-30 PROCEDURE — 99214 OFFICE O/P EST MOD 30 MIN: CPT | Mod: S$GLB,,, | Performed by: INTERNAL MEDICINE

## 2018-11-30 PROCEDURE — 99999 PR PBB SHADOW E&M-EST. PATIENT-LVL III: CPT | Mod: PBBFAC,,, | Performed by: INTERNAL MEDICINE

## 2018-11-30 PROCEDURE — 3008F BODY MASS INDEX DOCD: CPT | Mod: CPTII,S$GLB,, | Performed by: INTERNAL MEDICINE

## 2018-11-30 PROCEDURE — 80053 COMPREHEN METABOLIC PANEL: CPT

## 2018-11-30 PROCEDURE — 85025 COMPLETE CBC W/AUTO DIFF WBC: CPT

## 2018-11-30 PROCEDURE — 82306 VITAMIN D 25 HYDROXY: CPT

## 2018-11-30 PROCEDURE — 80061 LIPID PANEL: CPT

## 2018-11-30 PROCEDURE — 36415 COLL VENOUS BLD VENIPUNCTURE: CPT | Mod: PO

## 2018-11-30 PROCEDURE — 86703 HIV-1/HIV-2 1 RESULT ANTBDY: CPT

## 2018-11-30 RX ORDER — HYDROCORTISONE 25 MG/G
OINTMENT TOPICAL 2 TIMES DAILY
Qty: 20 G | Refills: 0 | Status: SHIPPED | OUTPATIENT
Start: 2018-11-30 | End: 2019-04-11

## 2018-11-30 NOTE — PROGRESS NOTES
Subjective:      Patient ID: Jessy Ferraro is a 27 y.o. female.    Chief Complaint: Rash (on lip**)      HPI     Ms. Jessy Ferraro is a patient of Anthony Williamson MD, who presents for rash on lips, which started Sunday 8:30 am with dry, itchy, bumpy (small) lips, which became notably swollen after trying to clean her lips with her toothbrush. No problems breathing. No new rx. No new foods. She took benadryl at 10 am, which decreased her lip swelling. The bumps gradually went away, but the angles of her mouth have become crusty and cracked. She denies licking her lips. No new personal hygiene products. Yesterday, she purchased lip balm with coconut oil, which has allowed her lips to look moist. The itchiness of her lips is limited to the mornings and is less intense than initially. She was wondering if her sx could be 2/2 an STD, but had no oral intercourse. She reports being mostly consistent with contraception i.e. condoms. No urinary or vaginal sx.    She also notes having a recent cold, which has improved, but still has a lingering cough at night, which is sometimes productive of lime green mucous. She hasn't tried anything for her cough, but has been drinking peppermint tea.      Past Medical History:   Diagnosis Date    Chronic back pain     She attributes this to enlarged breasts since adolecense.    Vitamin D deficiency      Past Surgical History:   Procedure Laterality Date    BREAST RECONSTRUCTION Bilateral 09/24/2018    Bilat breast reduction     Social History     Socioeconomic History    Marital status: Single     Spouse name: Not on file    Number of children: Not on file    Years of education: Not on file    Highest education level: Not on file   Social Needs    Financial resource strain: Not on file    Food insecurity - worry: Not on file    Food insecurity - inability: Not on file    Transportation needs - medical: Not on file    Transportation needs - non-medical: Not on file   Occupational  "History    Not on file   Tobacco Use    Smoking status: Former Smoker     Types: Cigarettes     Last attempt to quit: 9/10/2018     Years since quittin.2    Smokeless tobacco: Never Used    Tobacco comment: cigarettes and marijuana   Substance and Sexual Activity    Alcohol use: Yes     Comment: 2 glasses 3x/wk    Drug use: No    Sexual activity: Yes     Partners: Male     Comment: 1 partner   Other Topics Concern    Not on file   Social History Narrative    Life goal: History, currently at [employer], working on her PhD        Breakfast: Oatmeal or eggs & avacodo; water or tea or coffee with creamer    Lunch: Salad or tuna-fish sandwich; water or Smoothie    Dinner: Soup; water    Snacks: Chocolate or nuts    Eats out: 2x/wk     Water: 7 (16 oz) bottles/day    PA: Runs 1x/wk, yoga 30-90 min/day (previously none)    Goal weight is 130 lbs     Family History   Problem Relation Age of Onset    Hypertension Mother     Stroke Mother        Current Outpatient Medications:     etonogestrel-ethinyl estradiol (NUVARING) 0.12-0.015 mg/24 hr vaginal ring, Place 1 ring intravaginally and leave in place for 21 days.  Leave ring out for 7 days each month to allow for menses., Disp: 3 each, Rfl: 3    hydrocortisone 2.5 % ointment, Apply topically 2 (two) times daily., Disp: 20 g, Rfl: 0    Review of patient's allergies indicates:  No Known Allergies     Review of Systems   All remaining systems negative    Objective:     /70 (BP Location: Left arm, Patient Position: Sitting, BP Method: Medium (Manual))   Pulse 96   Temp 97.6 °F (36.4 °C) (Tympanic)   Resp 18   Ht 5' 5" (1.651 m)   Wt 63.3 kg (139 lb 8.8 oz)   SpO2 98%   BMI 23.22 kg/m²     Physical Exam  GEN: A&O fully, NAD  PSYC: Normal affect  HEENT: OP: Lips with right angle with a break at the margin, left WNL, Clear, no tongue swelling, no swollen palate, no LAD  CV: RRR, no M/G/R  PULM: CTA bilaterally, no wheezes, rales, crackles       Lab " Results   Component Value Date    WBC 5.94 09/06/2018    HGB 12.4 09/06/2018    HCT 39.3 09/06/2018     09/06/2018    CHOL 148 12/18/2017    TRIG 33 12/18/2017    HDL 67 12/18/2017    LDLCALC 74.4 12/18/2017    ALT 13 09/06/2018    AST 17 09/06/2018     09/06/2018    K 4.4 09/06/2018     09/06/2018    CREATININE 0.8 09/06/2018    BUN 6 09/06/2018    CO2 25 09/06/2018    CALCIUM 10.1 09/06/2018       Assessment:      1. Angular cheilitis: Risks and benefits discussed and patient chose to move forward with steroid ointment and understands to try OTC neosporin or triple AB cream prior to steroid.    2. Cough: Improving. Likely viral etiology. Recommended:    Ginger/lemon/honey Juice          Ingredients (preferably organic & local):    3-5 Ginger roots   3 lavelle and honey      Preparation:    Ginger root   - Wash   - Chop   - Add to  with an equal proportion of water   - Blend   - Strain   - Pour to fill ¾ of any size container (i.e. glass bottle)    Lavelle   - Squeeze lavelle    - Pour juice to fill ¼ of glass bottle    Add honey to glass bottle to taste      Storage & Application    Refrigerate   Enjoy   - Shake & sip as needed for cough, congestion, sore throat (also good for BPH)   - Avoid drinking >3 oz in one sitting, which can lead to gastrointestinal irritation     HM: Will check labs for annual in 6 weeks now.     Plan:   Angular cheilitis  -     hydrocortisone 2.5 % ointment; Apply topically 2 (two) times daily.  Dispense: 20 g; Refill: 0    Cough  -     CBC auto differential; Future; Expected date: 11/30/2018  -     Comprehensive metabolic panel; Future; Expected date: 11/30/2018  -     Lipid panel; Future; Expected date: 11/30/2018  -     HIV 1/2 Ag/Ab (4th Gen); Future; Expected date: 11/30/2018    Vitamin D deficiency  -     Vitamin D; Future; Expected date: 11/30/2018        Follow-up in about 6 weeks (around 1/11/2019), or if symptoms worsen or fail to improve, for  Annual.    I spent 25 minutes of time with patient 50% or more of which was discussing labs and plans of care.

## 2018-12-03 ENCOUNTER — PATIENT MESSAGE (OUTPATIENT)
Dept: INTERNAL MEDICINE | Facility: CLINIC | Age: 28
End: 2018-12-03

## 2018-12-03 LAB — HIV 1+2 AB+HIV1 P24 AG SERPL QL IA: NEGATIVE

## 2019-02-27 DIAGNOSIS — Z30.44 ENCOUNTER FOR SURVEILLANCE OF VAGINAL RING HORMONAL CONTRACEPTIVE DEVICE: ICD-10-CM

## 2019-02-27 RX ORDER — ETONOGESTREL AND ETHINYL ESTRADIOL VAGINAL RING .015; .12 MG/D; MG/D
RING VAGINAL
Qty: 3 EACH | Refills: 3 | Status: SHIPPED | OUTPATIENT
Start: 2019-02-27 | End: 2019-10-30 | Stop reason: SDUPTHER

## 2019-02-27 NOTE — TELEPHONE ENCOUNTER
Pt requesting birth control refill. Last visit 10/11/18. Orders pended. Pharmacy verified. Please advise.

## 2019-04-10 ENCOUNTER — TELEPHONE (OUTPATIENT)
Dept: OBSTETRICS AND GYNECOLOGY | Facility: CLINIC | Age: 29
End: 2019-04-10

## 2019-04-11 ENCOUNTER — OFFICE VISIT (OUTPATIENT)
Dept: OBSTETRICS AND GYNECOLOGY | Facility: CLINIC | Age: 29
End: 2019-04-11
Payer: MEDICAID

## 2019-04-11 VITALS
BODY MASS INDEX: 25.09 KG/M2 | SYSTOLIC BLOOD PRESSURE: 118 MMHG | DIASTOLIC BLOOD PRESSURE: 62 MMHG | WEIGHT: 150.81 LBS

## 2019-04-11 DIAGNOSIS — N76.0 BV (BACTERIAL VAGINOSIS): Primary | ICD-10-CM

## 2019-04-11 DIAGNOSIS — B96.89 BV (BACTERIAL VAGINOSIS): Primary | ICD-10-CM

## 2019-04-11 PROBLEM — G89.29 CHRONIC LOW BACK PAIN WITHOUT SCIATICA: Status: RESOLVED | Noted: 2018-02-08 | Resolved: 2019-04-11

## 2019-04-11 PROBLEM — M54.50 CHRONIC LOW BACK PAIN WITHOUT SCIATICA: Status: RESOLVED | Noted: 2018-02-08 | Resolved: 2019-04-11

## 2019-04-11 PROBLEM — N10 PYELONEPHRITIS, ACUTE: Status: RESOLVED | Noted: 2018-02-08 | Resolved: 2019-04-11

## 2019-04-11 PROCEDURE — 99999 PR PBB SHADOW E&M-EST. PATIENT-LVL II: CPT | Mod: PBBFAC,,, | Performed by: NURSE PRACTITIONER

## 2019-04-11 PROCEDURE — 99212 OFFICE O/P EST SF 10 MIN: CPT | Mod: PBBFAC | Performed by: NURSE PRACTITIONER

## 2019-04-11 PROCEDURE — 99213 PR OFFICE/OUTPT VISIT, EST, LEVL III, 20-29 MIN: ICD-10-PCS | Mod: S$PBB,,, | Performed by: NURSE PRACTITIONER

## 2019-04-11 PROCEDURE — 87480 CANDIDA DNA DIR PROBE: CPT

## 2019-04-11 PROCEDURE — 87491 CHLMYD TRACH DNA AMP PROBE: CPT

## 2019-04-11 PROCEDURE — 99999 PR PBB SHADOW E&M-EST. PATIENT-LVL II: ICD-10-PCS | Mod: PBBFAC,,, | Performed by: NURSE PRACTITIONER

## 2019-04-11 PROCEDURE — 99213 OFFICE O/P EST LOW 20 MIN: CPT | Mod: S$PBB,,, | Performed by: NURSE PRACTITIONER

## 2019-04-11 PROCEDURE — 87510 GARDNER VAG DNA DIR PROBE: CPT

## 2019-04-11 RX ORDER — METRONIDAZOLE 500 MG/1
500 TABLET ORAL 2 TIMES DAILY
Qty: 14 TABLET | Refills: 0 | Status: SHIPPED | OUTPATIENT
Start: 2019-04-11 | End: 2019-04-18

## 2019-04-11 NOTE — PATIENT INSTRUCTIONS

## 2019-04-11 NOTE — PROGRESS NOTES
CC: Vaginal odor    Jessy Ferraro is a 28 y.o. female  presents for vaginal odor.  LMP: Patient's last menstrual period was 2019..  No pelvic pain. OTC yeast meds made it worse.     Past Medical History:   Diagnosis Date    Chronic back pain     She attributes this to enlarged breasts since adolecense.    Vitamin D deficiency      Past Surgical History:   Procedure Laterality Date    BREAST RECONSTRUCTION Bilateral 2018    Bilat breast reduction    ECTOPIC PREGNANCY SURGERY       Social History     Socioeconomic History    Marital status: Single     Spouse name: Not on file    Number of children: Not on file    Years of education: Not on file    Highest education level: Not on file   Occupational History    Not on file   Social Needs    Financial resource strain: Not on file    Food insecurity:     Worry: Not on file     Inability: Not on file    Transportation needs:     Medical: Not on file     Non-medical: Not on file   Tobacco Use    Smoking status: Former Smoker     Types: Cigarettes     Last attempt to quit: 9/10/2018     Years since quittin.5    Smokeless tobacco: Never Used    Tobacco comment: cigarettes and marijuana   Substance and Sexual Activity    Alcohol use: Yes     Comment: 2 glasses 3x/wk    Drug use: Not Currently    Sexual activity: Yes     Partners: Male     Birth control/protection: Inserts     Comment: 1 partner   Lifestyle    Physical activity:     Days per week: Not on file     Minutes per session: Not on file    Stress: Not on file   Relationships    Social connections:     Talks on phone: Not on file     Gets together: Not on file     Attends Spiritism service: Not on file     Active member of club or organization: Not on file     Attends meetings of clubs or organizations: Not on file     Relationship status: Not on file   Other Topics Concern    Not on file   Social History Narrative    Life goal: History, currently at [employer],  working on her PhD        Breakfast: Oatmeal or eggs & avacodo; water or tea or coffee with creamer    Lunch: Salad or tuna-fish sandwich; water or Smoothie    Dinner: Soup; water    Snacks: Chocolate or nuts    Eats out: 2x/wk     Water: 7 (16 oz) bottles/day    PA: Runs 1x/wk, yoga 30-90 min/day (previously none)    Goal weight is 130 lbs     Family History   Problem Relation Age of Onset    Hypertension Mother     Stroke Mother      OB History        2    Para   1    Term                AB   1    Living           SAB        TAB        Ectopic   1    Multiple        Live Births                     /62   Wt 68.4 kg (150 lb 12.7 oz)   LMP 2019   BMI 25.09 kg/m²       ROS:  GENERAL: Denies weight gain or weight loss. Feeling well overall.   ABDOMEN: No abdominal pain, constipation, diarrhea, nausea, vomiting or rectal bleeding.   URINARY: No frequency, dysuria, hematuria, or burning on urination.  REPRODUCTIVE: See HPI.       PHYSICAL EXAM:  APPEARANCE: Well nourished, well developed, in no acute distress.  AFFECT: WNL, alert and oriented x 3  PELVIC: Normal external genitalia without lesions.  Vagina thick, white discharge.  1. BV (bacterial vaginosis)  C. trachomatis/N. gonorrhoeae by AMP DNA    Vaginosis Screen by DNA Probe    PLAN:  GC and Affirm cx  Wet prep: clue cells  Flagyl rx

## 2019-04-12 LAB
C TRACH DNA SPEC QL NAA+PROBE: NOT DETECTED
N GONORRHOEA DNA SPEC QL NAA+PROBE: NOT DETECTED

## 2019-04-18 LAB
BACTERIAL VAGINOSIS DNA: POSITIVE
CANDIDA GLABRATA DNA: NEGATIVE
CANDIDA KRUSEI DNA: NEGATIVE
CANDIDA RRNA VAG QL PROBE: NEGATIVE
T VAGINALIS RRNA GENITAL QL PROBE: NEGATIVE

## 2019-04-19 RX ORDER — METRONIDAZOLE 500 MG/1
500 TABLET ORAL 2 TIMES DAILY
Qty: 14 TABLET | Refills: 0 | Status: SHIPPED | OUTPATIENT
Start: 2019-04-19 | End: 2019-04-26

## 2019-07-29 ENCOUNTER — PATIENT MESSAGE (OUTPATIENT)
Dept: OBSTETRICS AND GYNECOLOGY | Facility: CLINIC | Age: 29
End: 2019-07-29

## 2019-08-01 ENCOUNTER — OFFICE VISIT (OUTPATIENT)
Dept: INTERNAL MEDICINE | Facility: CLINIC | Age: 29
End: 2019-08-01
Payer: MEDICAID

## 2019-08-01 ENCOUNTER — OFFICE VISIT (OUTPATIENT)
Dept: OBSTETRICS AND GYNECOLOGY | Facility: CLINIC | Age: 29
End: 2019-08-01
Payer: MEDICAID

## 2019-08-01 VITALS
SYSTOLIC BLOOD PRESSURE: 108 MMHG | HEIGHT: 65 IN | DIASTOLIC BLOOD PRESSURE: 62 MMHG | WEIGHT: 149.5 LBS | BODY MASS INDEX: 24.91 KG/M2

## 2019-08-01 VITALS
DIASTOLIC BLOOD PRESSURE: 82 MMHG | BODY MASS INDEX: 24.61 KG/M2 | TEMPERATURE: 98 F | WEIGHT: 147.69 LBS | SYSTOLIC BLOOD PRESSURE: 136 MMHG | HEIGHT: 65 IN | HEART RATE: 60 BPM

## 2019-08-01 DIAGNOSIS — E55.9 VITAMIN D DEFICIENCY: ICD-10-CM

## 2019-08-01 DIAGNOSIS — F41.9 ANXIETY: Primary | ICD-10-CM

## 2019-08-01 DIAGNOSIS — R45.89 DEPRESSED MOOD: ICD-10-CM

## 2019-08-01 DIAGNOSIS — F32.A DEPRESSION, UNSPECIFIED DEPRESSION TYPE: ICD-10-CM

## 2019-08-01 DIAGNOSIS — N76.0 BV (BACTERIAL VAGINOSIS): Primary | ICD-10-CM

## 2019-08-01 DIAGNOSIS — B96.89 BV (BACTERIAL VAGINOSIS): Primary | ICD-10-CM

## 2019-08-01 DIAGNOSIS — F41.0 GENERALIZED ANXIETY DISORDER WITH PANIC ATTACKS: ICD-10-CM

## 2019-08-01 DIAGNOSIS — F41.1 GENERALIZED ANXIETY DISORDER WITH PANIC ATTACKS: ICD-10-CM

## 2019-08-01 PROCEDURE — 99999 PR PBB SHADOW E&M-EST. PATIENT-LVL III: ICD-10-PCS | Mod: PBBFAC,,, | Performed by: NURSE PRACTITIONER

## 2019-08-01 PROCEDURE — 99213 OFFICE O/P EST LOW 20 MIN: CPT | Mod: PBBFAC,27 | Performed by: NURSE PRACTITIONER

## 2019-08-01 PROCEDURE — 99214 PR OFFICE/OUTPT VISIT, EST, LEVL IV, 30-39 MIN: ICD-10-PCS | Mod: S$PBB,,, | Performed by: INTERNAL MEDICINE

## 2019-08-01 PROCEDURE — 99214 OFFICE O/P EST MOD 30 MIN: CPT | Mod: S$PBB,,, | Performed by: INTERNAL MEDICINE

## 2019-08-01 PROCEDURE — 99999 PR PBB SHADOW E&M-EST. PATIENT-LVL III: ICD-10-PCS | Mod: PBBFAC,,, | Performed by: INTERNAL MEDICINE

## 2019-08-01 PROCEDURE — 99213 PR OFFICE/OUTPT VISIT, EST, LEVL III, 20-29 MIN: ICD-10-PCS | Mod: S$PBB,,, | Performed by: NURSE PRACTITIONER

## 2019-08-01 PROCEDURE — 87661 TRICHOMONAS VAGINALIS AMPLIF: CPT

## 2019-08-01 PROCEDURE — 99213 OFFICE O/P EST LOW 20 MIN: CPT | Mod: PBBFAC,PN | Performed by: INTERNAL MEDICINE

## 2019-08-01 PROCEDURE — 87801 DETECT AGNT MULT DNA AMPLI: CPT

## 2019-08-01 PROCEDURE — 99999 PR PBB SHADOW E&M-EST. PATIENT-LVL III: CPT | Mod: PBBFAC,,, | Performed by: NURSE PRACTITIONER

## 2019-08-01 PROCEDURE — 87491 CHLMYD TRACH DNA AMP PROBE: CPT | Mod: 59

## 2019-08-01 PROCEDURE — 87481 CANDIDA DNA AMP PROBE: CPT | Mod: 59

## 2019-08-01 PROCEDURE — 99999 PR PBB SHADOW E&M-EST. PATIENT-LVL III: CPT | Mod: PBBFAC,,, | Performed by: INTERNAL MEDICINE

## 2019-08-01 PROCEDURE — 99213 OFFICE O/P EST LOW 20 MIN: CPT | Mod: S$PBB,,, | Performed by: NURSE PRACTITIONER

## 2019-08-01 RX ORDER — METRONIDAZOLE 500 MG/1
500 TABLET ORAL 2 TIMES DAILY
Qty: 14 TABLET | Refills: 0 | Status: SHIPPED | OUTPATIENT
Start: 2019-08-01 | End: 2019-08-08

## 2019-08-01 NOTE — PATIENT INSTRUCTIONS

## 2019-08-01 NOTE — PROGRESS NOTES
"Subjective:      Patient ID: Jessy Ferraro is a 28 y.o. female.    Chief Complaint: Depression      HPI     Ms. 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 someone's shoes on her shoes, which was more upsetting than she would expect of herself, which she describes as taking it personally as if her uncle intentionally wanted to disrespect her. She reports her sx of depressed mood have been occurring since 6/2019. She reports getting nervous driving, which she describes as a panic attack, including a feeling of possibly getting in a car accident, which she describes as her biggest fear. She describes her panic attack as heavy breathing, chest tightness, which required pulling the car over and having to close her eyes and deep breathing, which lasted 15 minutes. She has never had a panic attack in the past. She reports waking up in the middle of the night, which a sweating stomach. She notes sleeping all day on most days she is not working. 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      PHQ-9:  Depression screen score  For each sx, indicated if in the past 2 weeks it has bothered you:  0: no days  1: some days  2: most days  3: ~daily    1. Interest: 2  2. Mood: 2  3. Sleep: 3  4. Energy: 1    5. Appetite: 3  6. Self-worth: 2  7. Concentration: 1   8. Psychomotor: 0   9. SI: 0    Cut points of 5, 10, 15, 20:  1. Minimal: 1-4  2. Mild: 5-9  3. Mod: 10-14  4. Mod sev: 15-19  5. Severe: 20-27      VS, labs & imaging reviewed and discussed with patient, including vitD 11 ng/mL, cbc/cmp/flp all WNL on 11/30/18; TFTs WNL 12/18/17. She reports not supplementing vitamin D.    Of note, EPIC indicates due preventive measures, including HPV.       Past Medical History: "   Diagnosis Date    Chronic back pain     She attributes this to enlarged breasts since adolecense.    Depressed mood     PHQ9 of 14 (moderate) over the past 2 months    Generalized anxiety disorder with panic attacks 2019    Vitamin D deficiency      Past Surgical History:   Procedure Laterality Date    BREAST RECONSTRUCTION Bilateral 2018    Bilat breast reduction    ECTOPIC PREGNANCY SURGERY       Social History     Socioeconomic History    Marital status: Single     Spouse name: Not on file    Number of children: Not on file    Years of education: Not on file    Highest education level: Not on file   Occupational History    Not on file   Social Needs    Financial resource strain: Not on file    Food insecurity:     Worry: Not on file     Inability: Not on file    Transportation needs:     Medical: Not on file     Non-medical: Not on file   Tobacco Use    Smoking status: Former Smoker     Types: Cigarettes     Last attempt to quit: 9/10/2018     Years since quittin.8    Smokeless tobacco: Never Used    Tobacco comment: cigarettes and marijuana   Substance and Sexual Activity    Alcohol use: Yes     Comment: 2 glasses 3x/wk    Drug use: Not Currently    Sexual activity: Yes     Partners: Male     Birth control/protection: Inserts     Comment: 1 partner   Lifestyle    Physical activity:     Days per week: Not on file     Minutes per session: Not on file    Stress: Not at all   Relationships    Social connections:     Talks on phone: Not on file     Gets together: Not on file     Attends Yazdanism service: Not on file     Active member of club or organization: Not on file     Attends meetings of clubs or organizations: Not on file     Relationship status: Not on file   Other Topics Concern    Not on file   Social History Narrative    Life goal: History, currently at [employer], working on her PhD        Breakfast: Oatmeal or eggs & avacodo; water or tea or coffee with creamer     "Lunch: Salad or tuna-fish sandwich; water or Smoothie    Dinner: Soup; water    Snacks: Chocolate or nuts    Eats out: 2x/wk     Water: 7 (16 oz) bottles/day    PA: Runs 1x/wk, yoga 30-90 min/day (previously none)    Goal weight is 130 lbs     Family History   Problem Relation Age of Onset    Hypertension Mother     Stroke Mother        Current Outpatient Medications:     etonogestrel-ethinyl estradiol (NUVARING) 0.12-0.015 mg/24 hr vaginal ring, Place 1 ring intravaginally and leave in place for 21 days.  Leave ring out for 7 days each month to allow for menses., Disp: 3 each, Rfl: 3    Review of patient's allergies indicates:  No Known Allergies     Review of Systems   All remaining systems negative    Objective:     /82 (BP Location: Left arm, Patient Position: Sitting, BP Method: Medium (Automatic))   Pulse 60   Temp 98 °F (36.7 °C) (Tympanic)   Ht 5' 5" (1.651 m)   Wt 67 kg (147 lb 11.3 oz)   BMI 24.58 kg/m²     Physical Exam  GEN: A&O fully, NAD  PSYC: Normal affect  CV: RRR, no M/G/R  PULM: CTA bilaterally, no wheezes, rales, crackles   EXT: No C/C/E, normal DP pulses bilaterally      Lab Results   Component Value Date    WBC 4.17 11/30/2018    HGB 13.0 11/30/2018    HCT 41.4 11/30/2018     (H) 11/30/2018    CHOL 183 11/30/2018    TRIG 41 11/30/2018    HDL 68 11/30/2018    LDLCALC 106.8 11/30/2018    ALT 13 11/30/2018    AST 17 11/30/2018     11/30/2018    K 4.3 11/30/2018     11/30/2018    CREATININE 0.8 11/30/2018    BUN 7 11/30/2018    CO2 25 11/30/2018    CALCIUM 9.4 11/30/2018       Assessment:      1. Anxiety: Risks and benefits discussed and patient chose to move forward with hydroxyzine 25 mg 1 po QD prn.    2. Depression, unspecified depression type: Risks and benefits discussed and patient chose to move forward with talk tx, for which we provided name/number.   3. HM: She declines HPV at this time.    4. Back pain: Improved!    5. Vitamin D deficiency: She prefers to " supplement vitamin D 2000 IU po qd for now and f/u in 1 month.       Plan:   Anxiety    Depression, unspecified depression type    Depressed mood    Generalized anxiety disorder with panic attacks    Vitamin D deficiency      Follow up in about 4 weeks (around 8/29/2019), or if symptoms worsen or fail to improve, for FU on anxiety, depressed mood, vitD defic.    I spent >25 minutes of time with patient 50% or more of which was discussing labs and plans of care.

## 2019-08-01 NOTE — PROGRESS NOTES
CC: Vaginal odor    Jessy Ferraro is a 28 y.o. female  presents for vaginal odor.  LMP: Patient's last menstrual period was 2019..  No pelvic pain.     Past Medical History:   Diagnosis Date    Chronic back pain     She attributes this to enlarged breasts since adolecense.    Depressed mood     PHQ9 of 14 (moderate) over the past 2 months    Generalized anxiety disorder with panic attacks 2019    Generalized anxiety disorder with panic attacks     Vitamin D deficiency      Past Surgical History:   Procedure Laterality Date    BREAST RECONSTRUCTION Bilateral 2018    Bilat breast reduction    ECTOPIC PREGNANCY SURGERY       Social History     Socioeconomic History    Marital status: Single     Spouse name: Not on file    Number of children: Not on file    Years of education: Not on file    Highest education level: Not on file   Occupational History    Not on file   Social Needs    Financial resource strain: Not on file    Food insecurity:     Worry: Not on file     Inability: Not on file    Transportation needs:     Medical: Not on file     Non-medical: Not on file   Tobacco Use    Smoking status: Former Smoker     Types: Cigarettes     Last attempt to quit: 9/10/2018     Years since quittin.8    Smokeless tobacco: Never Used    Tobacco comment: cigarettes   Substance and Sexual Activity    Alcohol use: Yes     Frequency: 4 or more times a week     Drinks per session: 3 or 4     Binge frequency: Never     Comment: 2 glasses 3x/wk    Drug use: Not Currently     Types: Marijuana, Cocaine     Comment: acid    Sexual activity: Not Currently     Partners: Male     Birth control/protection: None     Comment: 1 partner   Lifestyle    Physical activity:     Days per week: Not on file     Minutes per session: Not on file    Stress: Not at all   Relationships    Social connections:     Talks on phone: Not on file     Gets together: Not on file     Attends Mosque  "service: Not on file     Active member of club or organization: Not on file     Attends meetings of clubs or organizations: Not on file     Relationship status: Not on file   Other Topics Concern    Not on file   Social History Narrative    Life goal: History, currently at [employer], working on her PhD        Breakfast: Oatmeal or eggs & avacodo; water or tea or coffee with creamer    Lunch: Salad or tuna-fish sandwich; water or Smoothie    Dinner: Soup; water    Snacks: Chocolate or nuts    Eats out: 2x/wk     Water: 7 (16 oz) bottles/day    PA: Runs 1x/wk, yoga 30-90 min/day (previously none)    Goal weight is 130 lbs     Family History   Problem Relation Age of Onset    Hypertension Mother     Stroke Mother     Bipolar disorder Father      OB History        2    Para   1    Term   1            AB   1    Living   1       SAB        TAB        Ectopic   1    Multiple        Live Births   1                 /62   Ht 5' 5" (1.651 m)   Wt 67.8 kg (149 lb 7.6 oz)   LMP 2019   BMI 24.87 kg/m²       ROS:  GENERAL: Denies weight gain or weight loss. Feeling well overall.   REPRODUCTIVE: See HPI.       PHYSICAL EXAM:  APPEARANCE: Well nourished, well developed, in no acute distress.  AFFECT: WNL, alert and oriented x 3  PELVIC: Normal external genitalia without lesions.    Vagina scant, white discharge.     1. BV (bacterial vaginosis)  C. trachomatis/N. gonorrhoeae by AMP DNA    Vaginosis Screen by DNA Probe    PLAN:  Wet prep: clue cells  GC and Affirm cx  Flagyl rx  "

## 2019-08-02 LAB
BACTERIAL VAGINOSIS DNA: NEGATIVE
C TRACH DNA SPEC QL NAA+PROBE: NOT DETECTED
CANDIDA GLABRATA DNA: NEGATIVE
CANDIDA KRUSEI DNA: NEGATIVE
CANDIDA RRNA VAG QL PROBE: POSITIVE
N GONORRHOEA DNA SPEC QL NAA+PROBE: NOT DETECTED
T VAGINALIS RRNA GENITAL QL PROBE: NEGATIVE

## 2019-08-02 RX ORDER — FLUCONAZOLE 150 MG/1
150 TABLET ORAL DAILY
Qty: 2 TABLET | Refills: 0 | Status: SHIPPED | OUTPATIENT
Start: 2019-08-02 | End: 2019-08-04

## 2019-10-02 ENCOUNTER — PATIENT OUTREACH (OUTPATIENT)
Dept: ADMINISTRATIVE | Facility: HOSPITAL | Age: 29
End: 2019-10-02

## 2019-10-30 DIAGNOSIS — Z30.44 ENCOUNTER FOR SURVEILLANCE OF VAGINAL RING HORMONAL CONTRACEPTIVE DEVICE: ICD-10-CM

## 2019-10-30 RX ORDER — ETONOGESTREL AND ETHINYL ESTRADIOL VAGINAL RING .015; .12 MG/D; MG/D
RING VAGINAL
Qty: 1 EACH | Refills: 0 | Status: SHIPPED | OUTPATIENT
Start: 2019-10-30 | End: 2019-11-25 | Stop reason: SDUPTHER

## 2019-10-30 NOTE — TELEPHONE ENCOUNTER
Pt's last annual 10/2018, pt notified that she is due for an annual exam. Pt requesting refill on Nuvaring. Medication pended. Pharmacy up to date. Please advise.

## 2019-11-01 ENCOUNTER — TELEPHONE (OUTPATIENT)
Dept: INTERNAL MEDICINE | Facility: CLINIC | Age: 29
End: 2019-11-01

## 2019-11-01 ENCOUNTER — OFFICE VISIT (OUTPATIENT)
Dept: INTERNAL MEDICINE | Facility: CLINIC | Age: 29
End: 2019-11-01
Payer: MEDICAID

## 2019-11-01 VITALS
BODY MASS INDEX: 23.91 KG/M2 | HEART RATE: 90 BPM | WEIGHT: 143.5 LBS | DIASTOLIC BLOOD PRESSURE: 76 MMHG | SYSTOLIC BLOOD PRESSURE: 102 MMHG | OXYGEN SATURATION: 99 % | TEMPERATURE: 99 F | HEIGHT: 65 IN

## 2019-11-01 DIAGNOSIS — J32.9 SINUSITIS, UNSPECIFIED CHRONICITY, UNSPECIFIED LOCATION: ICD-10-CM

## 2019-11-01 DIAGNOSIS — J02.9 SORE THROAT: Primary | ICD-10-CM

## 2019-11-01 LAB
CTP QC/QA: YES
POC MOLECULAR INFLUENZA A AGN: NEGATIVE
POC MOLECULAR INFLUENZA B AGN: NEGATIVE

## 2019-11-01 PROCEDURE — 99999 PR PBB SHADOW E&M-EST. PATIENT-LVL III: CPT | Mod: PBBFAC,,, | Performed by: FAMILY MEDICINE

## 2019-11-01 PROCEDURE — 99213 OFFICE O/P EST LOW 20 MIN: CPT | Mod: PBBFAC,PO | Performed by: FAMILY MEDICINE

## 2019-11-01 PROCEDURE — 99213 OFFICE O/P EST LOW 20 MIN: CPT | Mod: S$PBB,,, | Performed by: FAMILY MEDICINE

## 2019-11-01 PROCEDURE — 99999 PR PBB SHADOW E&M-EST. PATIENT-LVL III: ICD-10-PCS | Mod: PBBFAC,,, | Performed by: FAMILY MEDICINE

## 2019-11-01 PROCEDURE — 99213 PR OFFICE/OUTPT VISIT, EST, LEVL III, 20-29 MIN: ICD-10-PCS | Mod: S$PBB,,, | Performed by: FAMILY MEDICINE

## 2019-11-01 PROCEDURE — 87502 INFLUENZA DNA AMP PROBE: CPT | Mod: PBBFAC,PO | Performed by: FAMILY MEDICINE

## 2019-11-01 RX ORDER — METHYLPREDNISOLONE 4 MG/1
TABLET ORAL
Qty: 1 PACKAGE | Refills: 0 | Status: SHIPPED | OUTPATIENT
Start: 2019-11-01 | End: 2019-11-20

## 2019-11-01 RX ORDER — AZITHROMYCIN 250 MG/1
TABLET, FILM COATED ORAL
Qty: 6 TABLET | Refills: 0 | Status: SHIPPED | OUTPATIENT
Start: 2019-11-01 | End: 2019-11-06

## 2019-11-01 NOTE — LETTER
November 1, 2019    Jessy Ferraro  [address] [address]         [facility]  [address] [address]  Phone: [phone]  Fax: [phone] November 1, 2019     Patient: Jessy Ferraro   YOB: 1990   Date of Visit: 11/1/2019       To Whom It May Concern:    It is my medical opinion that Jessy Ferraro may return to work on 11/04/2019.    If you have any questions or concerns, please don't hesitate to call.    Sincerely,        Nayana Gupta LPN

## 2019-11-01 NOTE — TELEPHONE ENCOUNTER
Pt requesting to be seen today. Let her know  is full but offered her [facility] location. Pt agreed and scheduled appt for her to see  as requested. Pt verbalized understanding.

## 2019-11-01 NOTE — TELEPHONE ENCOUNTER
----- Message from Mateo Kyle sent at 11/1/2019  8:10 AM CDT -----  Contact: self [phone]  .Type:  Same Day Appointment Request    Caller is requesting a same day appointment.  Caller declined first available appointment listed below.    Name of Caller:Jessy Ferraro  When is the first available appointment?11/04/19  Symptoms:sore throat/congestion/body aches  Best Call Back Number:[phone]  Additional Information:

## 2019-11-04 NOTE — PROGRESS NOTES
Subjective:      Patient ID: Jessy Ferraro is a 28 y.o. female.    Chief Complaint: Sore Throat        Patient reports sore throat, headache, hoarseness, body aches, coughing.  Symptoms started about a week ago, no fevers    Review of Systems   Constitutional: Positive for fatigue. Negative for activity change, appetite change and fever.   HENT: Positive for congestion, postnasal drip, rhinorrhea, sinus pressure, sinus pain and sore throat.    Respiratory: Positive for cough. Negative for shortness of breath and wheezing.    Gastrointestinal: Negative for abdominal pain, nausea and vomiting.   Musculoskeletal: Negative for myalgias.   Skin: Negative for rash.   Neurological: Positive for headaches.     Past Medical History:   Diagnosis Date    Chronic back pain     She attributes this to enlarged breasts since adolecense.    Depressed mood     PHQ9 of 14 (moderate) over the past 2 months    Generalized anxiety disorder with panic attacks 2019    Generalized anxiety disorder with panic attacks     Vitamin D deficiency      Past Surgical History:   Procedure Laterality Date    BREAST RECONSTRUCTION Bilateral 2018    Bilat breast reduction    ECTOPIC PREGNANCY SURGERY       Family History   Problem Relation Age of Onset    Hypertension Mother     Stroke Mother     Bipolar disorder Father      Social History     Socioeconomic History    Marital status: Single     Spouse name: Not on file    Number of children: Not on file    Years of education: Not on file    Highest education level: Not on file   Occupational History    Not on file   Social Needs    Financial resource strain: Not on file    Food insecurity:     Worry: Not on file     Inability: Not on file    Transportation needs:     Medical: Not on file     Non-medical: Not on file   Tobacco Use    Smoking status: Former Smoker     Types: Cigarettes     Last attempt to quit: 9/10/2018     Years since quittin.1     "Smokeless tobacco: Never Used    Tobacco comment: cigarettes   Substance and Sexual Activity    Alcohol use: Yes     Frequency: 4 or more times a week     Drinks per session: 3 or 4     Binge frequency: Never     Comment: 2 glasses 3x/wk    Drug use: Not Currently     Types: Marijuana, Cocaine     Comment: acid    Sexual activity: Not Currently     Partners: Male     Birth control/protection: None     Comment: 1 partner   Lifestyle    Physical activity:     Days per week: Not on file     Minutes per session: Not on file    Stress: Not at all   Relationships    Social connections:     Talks on phone: Not on file     Gets together: Not on file     Attends Yarsani service: Not on file     Active member of club or organization: Not on file     Attends meetings of clubs or organizations: Not on file     Relationship status: Not on file   Other Topics Concern    Not on file   Social History Narrative    Life goal: History, currently at [employer], working on her PhD        Breakfast: Oatmeal or eggs & avacodo; water or tea or coffee with creamer    Lunch: Salad or tuna-fish sandwich; water or Smoothie    Dinner: Soup; water    Snacks: Chocolate or nuts    Eats out: 2x/wk     Water: 7 (16 oz) bottles/day    PA: Runs 1x/wk, yoga 30-90 min/day (previously none)    Goal weight is 130 lbs     Review of patient's allergies indicates:  No Known Allergies    Objective:       /76 (BP Location: Left arm)   Pulse 90   Temp 99 °F (37.2 °C) (Tympanic)   Ht 5' 5" (1.651 m)   Wt 65.1 kg (143 lb 8.3 oz)   SpO2 99%   BMI 23.88 kg/m²   Physical Exam   Constitutional: She appears well-developed and well-nourished. No distress.   HENT:   Head: Normocephalic.   Right Ear: Hearing, tympanic membrane, external ear and ear canal normal.   Left Ear: Hearing, tympanic membrane, external ear and ear canal normal.   Nose: Mucosal edema present. Right sinus exhibits frontal sinus tenderness. Right sinus exhibits no maxillary sinus " tenderness. Left sinus exhibits frontal sinus tenderness. Left sinus exhibits no maxillary sinus tenderness.   Mouth/Throat: Uvula is midline and mucous membranes are normal. Posterior oropharyngeal erythema present.   Eyes: Pupils are equal, round, and reactive to light. Conjunctivae and EOM are normal.   Cardiovascular: Normal rate, regular rhythm and normal heart sounds.   Pulmonary/Chest: Effort normal and breath sounds normal. No respiratory distress.   Lymphadenopathy:     She has no cervical adenopathy.   Skin: Skin is warm and dry. Capillary refill takes less than 2 seconds. She is not diaphoretic.   Psychiatric: She has a normal mood and affect. Her behavior is normal.   Nursing note and vitals reviewed.    Assessment:     1. Sore throat    2. Sinusitis, unspecified chronicity, unspecified location      Plan:   Sore throat  -     POCT Influenza A/B Molecular - negative    Sinusitis, unspecified chronicity, unspecified location    Other orders  -     azithromycin (Z-SANG) 250 MG tablet; Take 2 tablets by mouth on day 1; Take 1 tablet by mouth on days 2-5  Dispense: 6 tablet; Refill: 0  -     methylPREDNISolone (MEDROL DOSEPACK) 4 mg tablet; use as directed  Dispense: 1 Package; Refill: 0      Medication List with Changes/Refills   New Medications    AZITHROMYCIN (Z-SANG) 250 MG TABLET    Take 2 tablets by mouth on day 1; Take 1 tablet by mouth on days 2-5    METHYLPREDNISOLONE (MEDROL DOSEPACK) 4 MG TABLET    use as directed   Current Medications    ETONOGESTREL-ETHINYL ESTRADIOL (NUVARING) 0.12-0.015 MG/24 HR VAGINAL RING    Place 1 ring intravaginally and leave in place for 21 days.  Leave ring out for 7 days each month to allow for menses.

## 2019-11-05 ENCOUNTER — TELEPHONE (OUTPATIENT)
Dept: INTERNAL MEDICINE | Facility: CLINIC | Age: 29
End: 2019-11-05

## 2019-11-05 NOTE — TELEPHONE ENCOUNTER
Spoke with pt stated that she would like to schedule a appt with dr foy. Informed pt that message was sent to the wrong department. Informed pt that message will be sent to dr foy staff to give her a call to schedule her a appt. Pt stated that she need to discuss her medication.

## 2019-11-05 NOTE — TELEPHONE ENCOUNTER
----- Message from Celia Márquez sent at 11/4/2019  8:11 AM CST -----  ..Type:  Patient Returning Call    Who Calledpt   Who Left Message for Patient:  Does the patient know what this is regarding?: medication   Would the patient rather a call back or a response via [facility]? Call back   Best Call Back Number: [phone]  Additional Information: pt is requesting a call from nurse to discuss a medication for depression she was suppose to be placed on.

## 2019-11-14 ENCOUNTER — TELEPHONE (OUTPATIENT)
Dept: FAMILY MEDICINE | Facility: CLINIC | Age: 29
End: 2019-11-14

## 2019-11-15 ENCOUNTER — TELEPHONE (OUTPATIENT)
Dept: INTERNAL MEDICINE | Facility: CLINIC | Age: 29
End: 2019-11-15

## 2019-11-15 NOTE — TELEPHONE ENCOUNTER
----- Message from Yoselin Ogden sent at 11/15/2019  3:15 PM CST -----  Contact: pt  Type:  Patient Returning Call    Who Called:pt  Who Left Message for Patient:nurse  Does the patient know what this is regarding?:appt   Would the patient rather a call back or a response via [facility]? Call back  Best Call Back Number:[phone]  Additional Information:

## 2019-11-20 ENCOUNTER — OFFICE VISIT (OUTPATIENT)
Dept: INTERNAL MEDICINE | Facility: CLINIC | Age: 29
End: 2019-11-20
Payer: MEDICAID

## 2019-11-20 ENCOUNTER — LAB VISIT (OUTPATIENT)
Dept: LAB | Facility: HOSPITAL | Age: 29
End: 2019-11-20
Payer: MEDICAID

## 2019-11-20 VITALS
HEIGHT: 65 IN | HEART RATE: 80 BPM | DIASTOLIC BLOOD PRESSURE: 61 MMHG | WEIGHT: 147.38 LBS | BODY MASS INDEX: 24.55 KG/M2 | OXYGEN SATURATION: 99 % | SYSTOLIC BLOOD PRESSURE: 111 MMHG | TEMPERATURE: 98 F

## 2019-11-20 DIAGNOSIS — F41.1 GENERALIZED ANXIETY DISORDER WITH PANIC ATTACKS: Primary | ICD-10-CM

## 2019-11-20 DIAGNOSIS — F41.0 GENERALIZED ANXIETY DISORDER WITH PANIC ATTACKS: Primary | ICD-10-CM

## 2019-11-20 DIAGNOSIS — F41.1 GENERALIZED ANXIETY DISORDER WITH PANIC ATTACKS: ICD-10-CM

## 2019-11-20 DIAGNOSIS — F41.0 GENERALIZED ANXIETY DISORDER WITH PANIC ATTACKS: ICD-10-CM

## 2019-11-20 LAB
25(OH)D3+25(OH)D2 SERPL-MCNC: 20 NG/ML (ref 30–96)
ALBUMIN SERPL BCP-MCNC: 3.7 G/DL (ref 3.5–5.2)
ALP SERPL-CCNC: 61 U/L (ref 55–135)
ALT SERPL W/O P-5'-P-CCNC: 15 U/L (ref 10–44)
ANION GAP SERPL CALC-SCNC: 8 MMOL/L (ref 8–16)
AST SERPL-CCNC: 19 U/L (ref 10–40)
BASOPHILS # BLD AUTO: 0.06 K/UL (ref 0–0.2)
BASOPHILS NFR BLD: 0.7 % (ref 0–1.9)
BILIRUB SERPL-MCNC: 0.4 MG/DL (ref 0.1–1)
BUN SERPL-MCNC: 9 MG/DL (ref 6–20)
CALCIUM SERPL-MCNC: 9.7 MG/DL (ref 8.7–10.5)
CHLORIDE SERPL-SCNC: 105 MMOL/L (ref 95–110)
CHOLEST SERPL-MCNC: 173 MG/DL (ref 120–199)
CHOLEST/HDLC SERPL: 2.2 {RATIO} (ref 2–5)
CO2 SERPL-SCNC: 28 MMOL/L (ref 23–29)
CREAT SERPL-MCNC: 0.8 MG/DL (ref 0.5–1.4)
DIFFERENTIAL METHOD: ABNORMAL
EOSINOPHIL # BLD AUTO: 0.2 K/UL (ref 0–0.5)
EOSINOPHIL NFR BLD: 2.2 % (ref 0–8)
ERYTHROCYTE [DISTWIDTH] IN BLOOD BY AUTOMATED COUNT: 14.7 % (ref 11.5–14.5)
EST. GFR  (AFRICAN AMERICAN): >60 ML/MIN/1.73 M^2
EST. GFR  (NON AFRICAN AMERICAN): >60 ML/MIN/1.73 M^2
GLUCOSE SERPL-MCNC: 71 MG/DL (ref 70–110)
HCT VFR BLD AUTO: 40.6 % (ref 37–48.5)
HDLC SERPL-MCNC: 80 MG/DL (ref 40–75)
HDLC SERPL: 46.2 % (ref 20–50)
HGB BLD-MCNC: 12.5 G/DL (ref 12–16)
IMM GRANULOCYTES # BLD AUTO: 0.05 K/UL (ref 0–0.04)
IMM GRANULOCYTES NFR BLD AUTO: 0.6 % (ref 0–0.5)
LDLC SERPL CALC-MCNC: 80.6 MG/DL (ref 63–159)
LYMPHOCYTES # BLD AUTO: 3.4 K/UL (ref 1–4.8)
LYMPHOCYTES NFR BLD: 39.6 % (ref 18–48)
MCH RBC QN AUTO: 27 PG (ref 27–31)
MCHC RBC AUTO-ENTMCNC: 30.8 G/DL (ref 32–36)
MCV RBC AUTO: 88 FL (ref 82–98)
MONOCYTES # BLD AUTO: 0.8 K/UL (ref 0.3–1)
MONOCYTES NFR BLD: 8.9 % (ref 4–15)
NEUTROPHILS # BLD AUTO: 4.1 K/UL (ref 1.8–7.7)
NEUTROPHILS NFR BLD: 48 % (ref 38–73)
NONHDLC SERPL-MCNC: 93 MG/DL
NRBC BLD-RTO: 0 /100 WBC
PLATELET # BLD AUTO: 380 K/UL (ref 150–350)
PMV BLD AUTO: 10.3 FL (ref 9.2–12.9)
POTASSIUM SERPL-SCNC: 4.4 MMOL/L (ref 3.5–5.1)
PROT SERPL-MCNC: 6.9 G/DL (ref 6–8.4)
RBC # BLD AUTO: 4.63 M/UL (ref 4–5.4)
SODIUM SERPL-SCNC: 141 MMOL/L (ref 136–145)
T4 FREE SERPL-MCNC: 1.02 NG/DL (ref 0.71–1.51)
TRIGL SERPL-MCNC: 62 MG/DL (ref 30–150)
TSH SERPL DL<=0.005 MIU/L-ACNC: 0.66 UIU/ML (ref 0.4–4)
WBC # BLD AUTO: 8.62 K/UL (ref 3.9–12.7)

## 2019-11-20 PROCEDURE — 82306 VITAMIN D 25 HYDROXY: CPT

## 2019-11-20 PROCEDURE — 36415 COLL VENOUS BLD VENIPUNCTURE: CPT | Mod: PO

## 2019-11-20 PROCEDURE — 84443 ASSAY THYROID STIM HORMONE: CPT

## 2019-11-20 PROCEDURE — 99999 PR PBB SHADOW E&M-EST. PATIENT-LVL III: CPT | Mod: PBBFAC,,, | Performed by: INTERNAL MEDICINE

## 2019-11-20 PROCEDURE — 99213 OFFICE O/P EST LOW 20 MIN: CPT | Mod: S$PBB,,, | Performed by: INTERNAL MEDICINE

## 2019-11-20 PROCEDURE — 84439 ASSAY OF FREE THYROXINE: CPT

## 2019-11-20 PROCEDURE — 99213 OFFICE O/P EST LOW 20 MIN: CPT | Mod: PBBFAC,PN | Performed by: INTERNAL MEDICINE

## 2019-11-20 PROCEDURE — 80061 LIPID PANEL: CPT

## 2019-11-20 PROCEDURE — 99213 PR OFFICE/OUTPT VISIT, EST, LEVL III, 20-29 MIN: ICD-10-PCS | Mod: S$PBB,,, | Performed by: INTERNAL MEDICINE

## 2019-11-20 PROCEDURE — 99999 PR PBB SHADOW E&M-EST. PATIENT-LVL III: ICD-10-PCS | Mod: PBBFAC,,, | Performed by: INTERNAL MEDICINE

## 2019-11-20 PROCEDURE — 85025 COMPLETE CBC W/AUTO DIFF WBC: CPT

## 2019-11-20 PROCEDURE — 80053 COMPREHEN METABOLIC PANEL: CPT

## 2019-11-20 RX ORDER — TRAZODONE HYDROCHLORIDE 50 MG/1
50 TABLET ORAL NIGHTLY
Qty: 30 TABLET | Refills: 11 | Status: SHIPPED | OUTPATIENT
Start: 2019-11-20 | End: 2020-11-19

## 2019-11-20 NOTE — PROGRESS NOTES
Subjective:      Patient ID: Jessy Ferraro is a 28 y.o. female.    Chief Complaint: URI      HPI     MsMikal Ferraro is a patient of Anthony Williamson MD, who presents for URI    She reports having two URIs in 2 weeks. She reports going to urgent care [facility] who felt she had a UTI and prescribed her prednisone 20 mg 1 po qd and steroid shot, which helped partially. She notes just having stopped taking prednisone Monday. She felt head congestion last night. No f/c. No ear or tooth pain. She endorses ear popping.     VS, labs & imaging reviewed and discussed with patient, including no labs since 18.    Of note, EPIC indicates due preventive measures, including FLU, which she declined today.       Past Medical History:   Diagnosis Date    Chronic back pain     She attributes this to enlarged breasts since adolecense.    Depressed mood     PHQ9 of 14 (moderate) over the past 2 months    Generalized anxiety disorder with panic attacks 2019    Generalized anxiety disorder with panic attacks     Vitamin D deficiency      Past Surgical History:   Procedure Laterality Date    BREAST RECONSTRUCTION Bilateral 2018    Bilat breast reduction    ECTOPIC PREGNANCY SURGERY       Social History     Socioeconomic History    Marital status: Single     Spouse name: Not on file    Number of children: Not on file    Years of education: Not on file    Highest education level: Not on file   Occupational History    Not on file   Social Needs    Financial resource strain: Not on file    Food insecurity:     Worry: Not on file     Inability: Not on file    Transportation needs:     Medical: Not on file     Non-medical: Not on file   Tobacco Use    Smoking status: Former Smoker     Types: Cigarettes     Last attempt to quit: 9/10/2018     Years since quittin.1    Smokeless tobacco: Never Used    Tobacco comment: cigarettes   Substance and Sexual Activity    Alcohol use:  "Yes     Frequency: 4 or more times a week     Drinks per session: 3 or 4     Binge frequency: Never     Comment: 2 glasses 3x/wk    Drug use: Not Currently     Types: Marijuana, Cocaine     Comment: acid    Sexual activity: Not Currently     Partners: Male     Birth control/protection: None     Comment: 1 partner   Lifestyle    Physical activity:     Days per week: Not on file     Minutes per session: Not on file    Stress: Not at all   Relationships    Social connections:     Talks on phone: Not on file     Gets together: Not on file     Attends Moravian service: Not on file     Active member of club or organization: Not on file     Attends meetings of clubs or organizations: Not on file     Relationship status: Not on file   Other Topics Concern    Not on file   Social History Narrative    Life goal: History, currently at [employer], working on her PhD        Breakfast: Oatmeal or eggs & avacodo; water or tea or coffee with creamer    Lunch: Salad or tuna-fish sandwich; water or Smoothie    Dinner: Soup; water    Snacks: Chocolate or nuts    Eats out: 2x/wk     Water: 7 (16 oz) bottles/day    PA: Runs 1x/wk, yoga 30-90 min/day (previously none)    Goal weight is 130 lbs     Family History   Problem Relation Age of Onset    Hypertension Mother     Stroke Mother     Bipolar disorder Father        Current Outpatient Medications:     etonogestrel-ethinyl estradiol (NUVARING) 0.12-0.015 mg/24 hr vaginal ring, Place 1 ring intravaginally and leave in place for 21 days.  Leave ring out for 7 days each month to allow for menses., Disp: 1 each, Rfl: 0    traZODone (DESYREL) 50 MG tablet, Take 1 tablet (50 mg total) by mouth every evening., Disp: 30 tablet, Rfl: 11    Review of patient's allergies indicates:  No Known Allergies     Review of Systems   All remaining systems negative    Objective:     /61 (BP Location: Left arm, Patient Position: Sitting)   Pulse 80   Temp 98 °F (36.7 °C)   Ht 5' 5" (1.651 " m)   Wt 66.8 kg (147 lb 6 oz)   SpO2 99%   BMI 24.52 kg/m²     Physical Exam  GEN: A&O fully, NAD  PSYC: Normal affect  HEENT: OP: Clear, no LAD, no thyroid masses; auditory canals WNL bilaterally   CV: RRR, no M/G/R  PULM: CTA bilaterally, no wheezes, rales      Lab Results   Component Value Date    WBC 4.17 11/30/2018    HGB 13.0 11/30/2018    HCT 41.4 11/30/2018     (H) 11/30/2018    CHOL 183 11/30/2018    TRIG 41 11/30/2018    HDL 68 11/30/2018    LDLCALC 106.8 11/30/2018    ALT 13 11/30/2018    AST 17 11/30/2018     11/30/2018    K 4.3 11/30/2018     11/30/2018    CREATININE 0.8 11/30/2018    BUN 7 11/30/2018    CO2 25 11/30/2018    CALCIUM 9.4 11/30/2018       Assessment:      1. Generalized anxiety disorder with panic attacks: She tried talk tx, which didn't work well for her due to her schedule and preference.        Plan:   Generalized anxiety disorder with panic attacks  -     traZODone (DESYREL) 50 MG tablet; Take 1 tablet (50 mg total) by mouth every evening.  Dispense: 30 tablet; Refill: 11  -     CBC auto differential; Future; Expected date: 11/20/2019  -     Comprehensive metabolic panel; Future; Expected date: 11/20/2019  -     Lipid panel; Future; Expected date: 11/20/2019  -     Vitamin D; Future; Expected date: 11/20/2019  -     TSH; Future; Expected date: 11/20/2019  -     T4, free; Future; Expected date: 11/20/2019      Follow up in about 4 weeks (around 12/18/2019), or if symptoms worsen or fail to improve, for Annual.

## 2019-11-25 ENCOUNTER — OFFICE VISIT (OUTPATIENT)
Dept: OBSTETRICS AND GYNECOLOGY | Facility: CLINIC | Age: 29
End: 2019-11-25
Payer: MEDICAID

## 2019-11-25 ENCOUNTER — LAB VISIT (OUTPATIENT)
Dept: LAB | Facility: HOSPITAL | Age: 29
End: 2019-11-25
Attending: NURSE PRACTITIONER
Payer: MEDICAID

## 2019-11-25 VITALS
WEIGHT: 142 LBS | BODY MASS INDEX: 23.66 KG/M2 | SYSTOLIC BLOOD PRESSURE: 122 MMHG | HEIGHT: 65 IN | DIASTOLIC BLOOD PRESSURE: 68 MMHG

## 2019-11-25 DIAGNOSIS — Z11.3 SCREEN FOR STD (SEXUALLY TRANSMITTED DISEASE): Primary | ICD-10-CM

## 2019-11-25 DIAGNOSIS — Z01.419 PAP SMEAR, AS PART OF ROUTINE GYNECOLOGICAL EXAMINATION: ICD-10-CM

## 2019-11-25 DIAGNOSIS — Z11.3 SCREEN FOR STD (SEXUALLY TRANSMITTED DISEASE): ICD-10-CM

## 2019-11-25 DIAGNOSIS — B96.89 BV (BACTERIAL VAGINOSIS): ICD-10-CM

## 2019-11-25 DIAGNOSIS — Z00.00 PREVENTATIVE HEALTH CARE: ICD-10-CM

## 2019-11-25 DIAGNOSIS — N76.0 BV (BACTERIAL VAGINOSIS): ICD-10-CM

## 2019-11-25 DIAGNOSIS — Z30.44 ENCOUNTER FOR SURVEILLANCE OF VAGINAL RING HORMONAL CONTRACEPTIVE DEVICE: ICD-10-CM

## 2019-11-25 PROCEDURE — 87801 DETECT AGNT MULT DNA AMPLI: CPT

## 2019-11-25 PROCEDURE — 87481 CANDIDA DNA AMP PROBE: CPT | Mod: 59

## 2019-11-25 PROCEDURE — 88175 CYTOPATH C/V AUTO FLUID REDO: CPT

## 2019-11-25 PROCEDURE — 86703 HIV-1/HIV-2 1 RESULT ANTBDY: CPT

## 2019-11-25 PROCEDURE — 99999 PR PBB SHADOW E&M-EST. PATIENT-LVL III: CPT | Mod: PBBFAC,,, | Performed by: NURSE PRACTITIONER

## 2019-11-25 PROCEDURE — 99213 OFFICE O/P EST LOW 20 MIN: CPT | Mod: PBBFAC | Performed by: NURSE PRACTITIONER

## 2019-11-25 PROCEDURE — 80074 ACUTE HEPATITIS PANEL: CPT

## 2019-11-25 PROCEDURE — 86696 HERPES SIMPLEX TYPE 2 TEST: CPT

## 2019-11-25 PROCEDURE — 86592 SYPHILIS TEST NON-TREP QUAL: CPT

## 2019-11-25 PROCEDURE — 99395 PR PREVENTIVE VISIT,EST,18-39: ICD-10-PCS | Mod: S$PBB,,, | Performed by: NURSE PRACTITIONER

## 2019-11-25 PROCEDURE — 87491 CHLMYD TRACH DNA AMP PROBE: CPT

## 2019-11-25 PROCEDURE — 99999 PR PBB SHADOW E&M-EST. PATIENT-LVL III: ICD-10-PCS | Mod: PBBFAC,,, | Performed by: NURSE PRACTITIONER

## 2019-11-25 PROCEDURE — 99395 PREV VISIT EST AGE 18-39: CPT | Mod: S$PBB,,, | Performed by: NURSE PRACTITIONER

## 2019-11-25 PROCEDURE — 36415 COLL VENOUS BLD VENIPUNCTURE: CPT

## 2019-11-25 RX ORDER — ETONOGESTREL AND ETHINYL ESTRADIOL VAGINAL RING .015; .12 MG/D; MG/D
RING VAGINAL
Qty: 1 EACH | Refills: 12 | Status: SHIPPED | OUTPATIENT
Start: 2019-11-25 | End: 2020-02-07 | Stop reason: SDUPTHER

## 2019-11-25 RX ORDER — METRONIDAZOLE 500 MG/1
500 TABLET ORAL 2 TIMES DAILY
Qty: 14 TABLET | Refills: 0 | Status: SHIPPED | OUTPATIENT
Start: 2019-11-25 | End: 2019-12-02

## 2019-11-25 NOTE — PATIENT INSTRUCTIONS

## 2019-11-25 NOTE — PROGRESS NOTES
CC: Well woman exam    Jessy Ferraro is a 28 y.o. female  presents for well woman exam.  LMP: Patient's last menstrual period was 2019..  No issues, problems, or complaints. Is sexually active, wants a STD assessment and a yearly pap exam. Last pap exam was normal. Cycles are every 26-28 days, not heavy. Using Nuvaring.      Past Medical History:   Diagnosis Date    Chronic back pain     She attributes this to enlarged breasts since adolecense.    Depressed mood     PHQ9 of 14 (moderate) over the past 2 months    Generalized anxiety disorder with panic attacks 2019    Generalized anxiety disorder with panic attacks     Vitamin D deficiency      Past Surgical History:   Procedure Laterality Date    BREAST RECONSTRUCTION Bilateral 2018    Bilat breast reduction    ECTOPIC PREGNANCY SURGERY       Social History     Socioeconomic History    Marital status: Single     Spouse name: Not on file    Number of children: Not on file    Years of education: Not on file    Highest education level: Not on file   Occupational History    Not on file   Social Needs    Financial resource strain: Not on file    Food insecurity:     Worry: Not on file     Inability: Not on file    Transportation needs:     Medical: Not on file     Non-medical: Not on file   Tobacco Use    Smoking status: Current Every Day Smoker     Types: Cigarettes     Last attempt to quit: 9/10/2018     Years since quittin.2    Smokeless tobacco: Never Used    Tobacco comment: cigarettes, 3 per day    Substance and Sexual Activity    Alcohol use: Yes     Frequency: 4 or more times a week     Drinks per session: 3 or 4     Binge frequency: Never     Comment: 2 glasses 3x/wk    Drug use: Not Currently     Types: Marijuana, Cocaine     Comment: acid    Sexual activity: Yes     Partners: Male     Birth control/protection: Inserts     Comment: 1 partner   Lifestyle    Physical activity:     Days per week: Not  "on file     Minutes per session: Not on file    Stress: Not at all   Relationships    Social connections:     Talks on phone: Not on file     Gets together: Not on file     Attends Jainism service: Not on file     Active member of club or organization: Not on file     Attends meetings of clubs or organizations: Not on file     Relationship status: Not on file   Other Topics Concern    Not on file   Social History Narrative    Life goal: History, currently at [employer], working on her PhD        Breakfast: Oatmeal or eggs & avacodo; water or tea or coffee with creamer    Lunch: Salad or tuna-fish sandwich; water or Smoothie    Dinner: Soup; water    Snacks: Chocolate or nuts    Eats out: 2x/wk     Water: 7 (16 oz) bottles/day    PA: Runs 1x/wk, yoga 30-90 min/day (previously none)    Goal weight is 130 lbs     Family History   Problem Relation Age of Onset    Hypertension Mother     Stroke Mother     Bipolar disorder Father     Stroke Maternal Grandmother      OB History        2    Para   1    Term   1            AB   1    Living   1       SAB        TAB        Ectopic   1    Multiple        Live Births   1                 /68   Ht 5' 5" (1.651 m)   Wt 64.4 kg (141 lb 15.6 oz)   LMP 2019   BMI 23.63 kg/m²       ROS:  GENERAL: Denies weight gain or weight loss. Feeling well overall.   SKIN: Denies rash or lesions.   HEAD: Denies head injury or headache.   NODES: Denies enlarged lymph nodes.   CHEST: Denies chest pain or shortness of breath.   CARDIOVASCULAR: Denies palpitations or left sided chest pain.   ABDOMEN: No abdominal pain, constipation, diarrhea, nausea, vomiting or rectal bleeding.   URINARY: No frequency, dysuria, hematuria, or burning on urination.  REPRODUCTIVE: See HPI.   BREASTS: The patient performs breast self-examination and denies pain, lumps, or nipple discharge.   HEMATOLOGIC: No easy bruisability or excessive bleeding.   MUSCULOSKELETAL: Denies joint pain " or swelling.   NEUROLOGIC: Denies syncope or weakness.   PSYCHIATRIC: Denies depression, anxiety or mood swings.    PHYSICAL EXAM:  APPEARANCE: Well nourished, well developed, in no acute distress.  AFFECT: WNL, alert and oriented x 3  SKIN: No acne or hirsutism  NECK: Neck symmetric without masses or thyromegaly  NODES: No inguinal, cervical, axillary, or femoral lymph node enlargement  CHEST: Good respiratory effect  ABDOMEN: Soft.  No tenderness or masses.  No hepatosplenomegaly.  No hernias.  BREASTS: Symmetrical, no skin changes or visible lesions.  No palpable masses, nipple discharge bilaterally.  PELVIC: Normal external genitalia without lesions.  Normal hair distribution.  Adequate perineal body, normal urethral meatus.  Vagina moist and well rugated without lesion, large amount of green discharge.  Cervix pink, without lesions, discharge or tenderness.  No significant cystocele or rectocele.  Bimanual exam shows uterus to be normal size, regular, mobile and nontender.  Adnexa without masses or tenderness.    EXTREMITIES: No edema.    1. Screen for STD (sexually transmitted disease)  Liquid-Based Pap Smear, Screening    RPR    HIV 1/2 Ag/Ab (4th Gen)    Hepatitis panel, acute    C. trachomatis/N. gonorrhoeae by AMP DNA    Vaginosis Screen by DNA Probe    Herpes Simplex Virus (HSV) Types 1 & 2, IgG, Herpes Titer   2. Pap smear, as part of routine gynecological examination  Liquid-Based Pap Smear, Screening    RPR    HIV 1/2 Ag/Ab (4th Gen)    Hepatitis panel, acute    C. trachomatis/N. gonorrhoeae by AMP DNA    Vaginosis Screen by DNA Probe    Herpes Simplex Virus (HSV) Types 1 & 2, IgG, Herpes Titer   3. Encounter for surveillance of vaginal ring hormonal contraceptive device  etonogestrel-ethinyl estradiol (NUVARING) 0.12-0.015 mg/24 hr vaginal ring     PLAN:  Pap exam  STD assessment  Flagyl rx

## 2019-11-26 DIAGNOSIS — Z30.44 ENCOUNTER FOR SURVEILLANCE OF VAGINAL RING HORMONAL CONTRACEPTIVE DEVICE: ICD-10-CM

## 2019-11-26 LAB
BACTERIAL VAGINOSIS DNA: NEGATIVE
C TRACH DNA SPEC QL NAA+PROBE: NOT DETECTED
CANDIDA GLABRATA DNA: NEGATIVE
CANDIDA KRUSEI DNA: NEGATIVE
CANDIDA RRNA VAG QL PROBE: POSITIVE
HAV IGM SERPL QL IA: NEGATIVE
HBV CORE IGM SERPL QL IA: NEGATIVE
HBV SURFACE AG SERPL QL IA: NEGATIVE
HCV AB SERPL QL IA: NEGATIVE
HIV 1+2 AB+HIV1 P24 AG SERPL QL IA: NEGATIVE
N GONORRHOEA DNA SPEC QL NAA+PROBE: NOT DETECTED
RPR SER QL: NORMAL
T VAGINALIS RRNA GENITAL QL PROBE: NEGATIVE

## 2019-11-26 RX ORDER — FLUCONAZOLE 150 MG/1
150 TABLET ORAL DAILY
Qty: 2 TABLET | Refills: 0 | Status: SHIPPED | OUTPATIENT
Start: 2019-11-26 | End: 2019-11-28

## 2019-11-26 RX ORDER — ETONOGESTREL AND ETHINYL ESTRADIOL VAGINAL RING .015; .12 MG/D; MG/D
RING VAGINAL
Refills: 0 | OUTPATIENT
Start: 2019-11-26

## 2019-11-29 LAB
HSV1 IGG SERPL QL IA: NEGATIVE
HSV2 IGG SERPL QL IA: NEGATIVE

## 2019-12-07 LAB
FINAL PATHOLOGIC DIAGNOSIS: NORMAL
Lab: NORMAL

## 2020-01-28 ENCOUNTER — PATIENT MESSAGE (OUTPATIENT)
Dept: OBSTETRICS AND GYNECOLOGY | Facility: CLINIC | Age: 30
End: 2020-01-28

## 2020-02-07 ENCOUNTER — OFFICE VISIT (OUTPATIENT)
Dept: OBSTETRICS AND GYNECOLOGY | Facility: CLINIC | Age: 30
End: 2020-02-07
Payer: MEDICAID

## 2020-02-07 VITALS
WEIGHT: 152.75 LBS | BODY MASS INDEX: 25.45 KG/M2 | HEIGHT: 65 IN | DIASTOLIC BLOOD PRESSURE: 62 MMHG | SYSTOLIC BLOOD PRESSURE: 118 MMHG

## 2020-02-07 DIAGNOSIS — Z30.44 ENCOUNTER FOR SURVEILLANCE OF VAGINAL RING HORMONAL CONTRACEPTIVE DEVICE: ICD-10-CM

## 2020-02-07 DIAGNOSIS — B37.31 CANDIDAL VAGINITIS: Primary | ICD-10-CM

## 2020-02-07 PROCEDURE — 99213 PR OFFICE/OUTPT VISIT, EST, LEVL III, 20-29 MIN: ICD-10-PCS | Mod: S$PBB,,, | Performed by: NURSE PRACTITIONER

## 2020-02-07 PROCEDURE — 87661 TRICHOMONAS VAGINALIS AMPLIF: CPT

## 2020-02-07 PROCEDURE — 99213 OFFICE O/P EST LOW 20 MIN: CPT | Mod: S$PBB,,, | Performed by: NURSE PRACTITIONER

## 2020-02-07 PROCEDURE — 99999 PR PBB SHADOW E&M-EST. PATIENT-LVL III: CPT | Mod: PBBFAC,,, | Performed by: NURSE PRACTITIONER

## 2020-02-07 PROCEDURE — 87481 CANDIDA DNA AMP PROBE: CPT | Mod: 59

## 2020-02-07 PROCEDURE — 99213 OFFICE O/P EST LOW 20 MIN: CPT | Mod: PBBFAC | Performed by: NURSE PRACTITIONER

## 2020-02-07 PROCEDURE — 87491 CHLMYD TRACH DNA AMP PROBE: CPT | Mod: 59

## 2020-02-07 PROCEDURE — 99999 PR PBB SHADOW E&M-EST. PATIENT-LVL III: ICD-10-PCS | Mod: PBBFAC,,, | Performed by: NURSE PRACTITIONER

## 2020-02-07 RX ORDER — ETONOGESTREL AND ETHINYL ESTRADIOL VAGINAL RING .015; .12 MG/D; MG/D
RING VAGINAL
Qty: 1 EACH | Refills: 12 | Status: SHIPPED | OUTPATIENT
Start: 2020-02-07

## 2020-02-07 NOTE — PROGRESS NOTES
CC: Refill Nuvaring    Jessy Sydnie Ferraro is a 29 y.o. female  presents for refill birth control and concerned about yeast infections..  LMP: Patient's last menstrual period was 2020..  No pelvic pain. Is sexually active.     Past Medical History:   Diagnosis Date    Chronic back pain     She attributes this to enlarged breasts since adolecense.    Depressed mood     PHQ9 of 14 (moderate) over the past 2 months    Generalized anxiety disorder with panic attacks 2019    Generalized anxiety disorder with panic attacks     Vitamin D deficiency      Past Surgical History:   Procedure Laterality Date    BREAST RECONSTRUCTION Bilateral 2018    Bilat breast reduction    ECTOPIC PREGNANCY SURGERY       Social History     Socioeconomic History    Marital status: Single     Spouse name: Not on file    Number of children: Not on file    Years of education: Not on file    Highest education level: Not on file   Occupational History    Not on file   Social Needs    Financial resource strain: Not on file    Food insecurity:     Worry: Not on file     Inability: Not on file    Transportation needs:     Medical: Not on file     Non-medical: Not on file   Tobacco Use    Smoking status: Former Smoker     Types: Cigarettes     Last attempt to quit: 9/10/2018     Years since quittin.4    Smokeless tobacco: Never Used    Tobacco comment: cigarettes, 3 per day    Substance and Sexual Activity    Alcohol use: Yes     Frequency: 2-3 times a week     Drinks per session: 3 or 4     Binge frequency: Never     Comment: 2 glasses 3x/wk    Drug use: Not Currently     Types: Marijuana, Cocaine     Comment: acid    Sexual activity: Yes     Partners: Male     Birth control/protection: Inserts     Comment: 1 partner   Lifestyle    Physical activity:     Days per week: Not on file     Minutes per session: Not on file    Stress: Not at all   Relationships    Social connections:     Talks on  "phone: Not on file     Gets together: Not on file     Attends Latter day service: Not on file     Active member of club or organization: Not on file     Attends meetings of clubs or organizations: Not on file     Relationship status: Not on file   Other Topics Concern    Not on file   Social History Narrative    Life goal: History, currently at [employer], working on her PhD        Breakfast: Oatmeal or eggs & avacodo; water or tea or coffee with creamer    Lunch: Salad or tuna-fish sandwich; water or Smoothie    Dinner: Soup; water    Snacks: Chocolate or nuts    Eats out: 2x/wk     Water: 7 (16 oz) bottles/day    PA: Runs 1x/wk, yoga 30-90 min/day (previously none)    Goal weight is 130 lbs     Family History   Problem Relation Age of Onset    Hypertension Mother     Stroke Mother     Bipolar disorder Father     Stroke Maternal Grandmother      OB History        2    Para   1    Term   1            AB   1    Living   1       SAB        TAB        Ectopic   1    Multiple        Live Births   1                 /62   Ht 5' 5" (1.651 m)   Wt 69.3 kg (152 lb 12.5 oz)   LMP 2020   BMI 25.42 kg/m²       ROS:  ABDOMEN: No abdominal pain, constipation, diarrhea, nausea, vomiting or rectal bleeding.   URINARY: No frequency, dysuria, hematuria, or burning on urination.  REPRODUCTIVE: See HPI.       PHYSICAL EXAM:  APPEARANCE: Well nourished, well developed, in no acute distress.  AFFECT: WNL, alert and oriented x 3  PELVIC: Normal external genitalia without lesions.  Vagina scant, white discharge.     1. Candidal vaginitis  C. trachomatis/N. gonorrhoeae by AMP DNA    Vaginosis Screen by DNA Probe   2. Encounter for surveillance of vaginal ring hormonal contraceptive device  etonogestrel-ethinyl estradiol (NUVARING) 0.12-0.015 mg/24 hr vaginal ring    PLAN:  Wet prep negative  GC cx  Refill Nuvaring          "

## 2020-02-09 LAB
C TRACH DNA SPEC QL NAA+PROBE: NOT DETECTED
N GONORRHOEA DNA SPEC QL NAA+PROBE: NOT DETECTED

## 2020-02-10 LAB
BACTERIAL VAGINOSIS DNA: POSITIVE
CANDIDA GLABRATA DNA: NEGATIVE
CANDIDA KRUSEI DNA: NEGATIVE
CANDIDA RRNA VAG QL PROBE: POSITIVE
T VAGINALIS RRNA GENITAL QL PROBE: NEGATIVE

## 2020-02-10 RX ORDER — FLUCONAZOLE 150 MG/1
150 TABLET ORAL DAILY
Qty: 2 TABLET | Refills: 0 | Status: SHIPPED | OUTPATIENT
Start: 2020-02-10 | End: 2020-02-12

## 2020-02-10 RX ORDER — METRONIDAZOLE 500 MG/1
500 TABLET ORAL 2 TIMES DAILY
Qty: 14 TABLET | Refills: 0 | Status: SHIPPED | OUTPATIENT
Start: 2020-02-10 | End: 2020-02-17